# Patient Record
Sex: MALE | Race: WHITE | NOT HISPANIC OR LATINO | Employment: FULL TIME | URBAN - METROPOLITAN AREA
[De-identification: names, ages, dates, MRNs, and addresses within clinical notes are randomized per-mention and may not be internally consistent; named-entity substitution may affect disease eponyms.]

---

## 2018-07-25 ENCOUNTER — HOSPITAL ENCOUNTER (EMERGENCY)
Facility: MEDICAL CENTER | Age: 28
End: 2018-07-26
Attending: EMERGENCY MEDICINE

## 2018-07-25 DIAGNOSIS — S61.210A LACERATION OF RIGHT INDEX FINGER WITHOUT FOREIGN BODY WITHOUT DAMAGE TO NAIL, INITIAL ENCOUNTER: ICD-10-CM

## 2018-07-25 PROCEDURE — 99283 EMERGENCY DEPT VISIT LOW MDM: CPT

## 2018-07-26 VITALS
HEIGHT: 71 IN | RESPIRATION RATE: 16 BRPM | TEMPERATURE: 99.1 F | SYSTOLIC BLOOD PRESSURE: 128 MMHG | OXYGEN SATURATION: 95 % | DIASTOLIC BLOOD PRESSURE: 79 MMHG | WEIGHT: 238.54 LBS | BODY MASS INDEX: 33.4 KG/M2 | HEART RATE: 93 BPM

## 2018-07-26 PROCEDURE — 700101 HCHG RX REV CODE 250

## 2018-07-26 PROCEDURE — 90715 TDAP VACCINE 7 YRS/> IM: CPT | Performed by: EMERGENCY MEDICINE

## 2018-07-26 PROCEDURE — 303747 HCHG EXTRA SUTURE

## 2018-07-26 PROCEDURE — 90471 IMMUNIZATION ADMIN: CPT

## 2018-07-26 PROCEDURE — 304999 HCHG REPAIR-SIMPLE/INTERMED LEVEL 1

## 2018-07-26 PROCEDURE — 700111 HCHG RX REV CODE 636 W/ 250 OVERRIDE (IP): Performed by: EMERGENCY MEDICINE

## 2018-07-26 PROCEDURE — 304217 HCHG IRRIGATION SYSTEM

## 2018-07-26 RX ORDER — LIDOCAINE HYDROCHLORIDE AND EPINEPHRINE BITARTRATE 20; .01 MG/ML; MG/ML
INJECTION, SOLUTION SUBCUTANEOUS
Status: COMPLETED
Start: 2018-07-26 | End: 2018-07-26

## 2018-07-26 RX ADMIN — LIDOCAINE HYDROCHLORIDE AND EPINEPHRINE: 20; 10 INJECTION, SOLUTION INFILTRATION; PERINEURAL at 01:30

## 2018-07-26 RX ADMIN — CLOSTRIDIUM TETANI TOXOID ANTIGEN (FORMALDEHYDE INACTIVATED), CORYNEBACTERIUM DIPHTHERIAE TOXOID ANTIGEN (FORMALDEHYDE INACTIVATED), BORDETELLA PERTUSSIS TOXOID ANTIGEN (GLUTARALDEHYDE INACTIVATED), BORDETELLA PERTUSSIS FILAMENTOUS HEMAGGLUTININ ANTIGEN (FORMALDEHYDE INACTIVATED), BORDETELLA PERTUSSIS PERTACTIN ANTIGEN, AND BORDETELLA PERTUSSIS FIMBRIAE 2/3 ANTIGEN 0.5 ML: 5; 2; 2.5; 5; 3; 5 INJECTION, SUSPENSION INTRAMUSCULAR at 01:34

## 2018-07-26 ASSESSMENT — PAIN SCALES - GENERAL: PAINLEVEL_OUTOF10: 5

## 2018-07-26 NOTE — ED NOTES
Pt is not a fall risk per [place] scale.  Provided call light and instructions to call for assistance.  Pt verbalizes understanding.

## 2018-07-26 NOTE — ED PROVIDER NOTES
"ED Provider Note    CHIEF COMPLAINT  Chief Complaint   Patient presents with   • Laceration     Patient opened a new switch blade knife and accidental cut his right pointer finger.        HPI  Rich Brown is a 27 y.o. male who presents to the emergency department the laceration. The patient inadvertently cut himself with a new knife to the right second phalanx. He does not have any loss of function. He has localized discomfort to the distal and palmar aspect of the right second phalanx. He does not have any loss of flexion or extension. His tetanus is not up to date.    REVIEW OF SYSTEMS  No other muscular skeletal complaints    PHYSICAL EXAM  VITAL SIGNS: /78   Pulse 96   Temp 37 °C (98.6 °F) (Temporal)   Resp 16   Ht 1.803 m (5' 11\")   Wt 108.2 kg (238 lb 8.6 oz)   SpO2 97%   BMI 33.27 kg/m²   In general the patient does not appear toxic  Extremities the patient has a 1 centimeter laceration to the palmar aspect of the right second phalanx. The patient does not have any skeletal deformities. The patient has full flexion and extension at the IP joints.  Skin 1 centimeter laceration described above  Neurovascular examination is intact of the right hand    PROCEDURES Laceration repair  A digital block was performed with lidocaine and epinephrine. After the block was performed the wound is irrigated by myself. I then placed 3 4-0 Ethilon sutures in simple interrupted fashion for primary closure.  COURSE & MEDICAL DECISION MAKING  Pertinent Labs & Imaging studies reviewed. (See chart for details)  This a 27-year-old male who presents with a laceration to the right second phalanx. Primary closure was performed. The patient will receive tetanus prophylaxis prior to discharge. The patient was discharged instructions return for any evidence of infection. Otherwise he'll have the sutures removed in 10 days.    FINAL IMPRESSION  1. 1 centimeter right second phalanx laceration       Disposition  The " patient will be discharged in stable condition      Electronically signed by: Roberto Gonzalez, 7/26/2018 1:31 AM

## 2018-07-26 NOTE — DISCHARGE INSTRUCTIONS
Facial Laceration  A facial laceration is a cut on the face. These injuries can be painful and cause bleeding. Some cuts may need to be closed with stitches (sutures), skin adhesive strips, or wound glue. Cuts usually heal quickly but can leave a scar. It can take 1-2 years for the scar to go away completely.  Follow these instructions at home:  · Only take medicines as told by your doctor.  · Follow your doctor's instructions for wound care.  For Stitches:  · Keep the cut clean and dry.  · If you have a bandage (dressing), change it at least once a day. Change the bandage if it gets wet or dirty, or as told by your doctor.  · Wash the cut with soap and water 2 times a day. Rinse the cut with water. Pat it dry with a clean towel.  · Put a thin layer of medicated cream on the cut as told by your doctor.  · You may shower after the first 24 hours. Do not soak the cut in water until the stitches are removed.  · Have your stitches removed as told by your doctor.  · Do not wear any makeup until a few days after your stitches are removed.  For Skin Adhesive Strips:  · Keep the cut clean and dry.  · Do not get the strips wet. You may take a bath, but be careful to keep the cut dry.  · If the cut gets wet, pat it dry with a clean towel.  · The strips will fall off on their own. Do not remove the strips that are still stuck to the cut.  For Wound Glue:  · You may shower or take baths. Do not soak or scrub the cut. Do not swim. Avoid heavy sweating until the glue falls off on its own. After a shower or bath, pat the cut dry with a clean towel.  · Do not put medicine or makeup on your cut until the glue falls off.  · If you have a bandage, do not put tape over the glue.  · Avoid lots of sunlight or tanning lamps until the glue falls off.  · The glue will fall off on its own in 5-10 days. Do not pick at the glue.  After Healing:  · Put sunscreen on the cut for the first year to reduce your scar.  Contact a doctor if:  · You  have a fever.  Get help right away if:  · Your cut area gets red, painful, or puffy (swollen).  · You see a yellowish-white fluid (pus) coming from the cut.  This information is not intended to replace advice given to you by your health care provider. Make sure you discuss any questions you have with your health care provider.  Document Released: 06/05/2009 Document Revised: 05/25/2017 Document Reviewed: 07/31/2014  [employer] Interactive Patient Education © 2017 [employer]

## 2018-07-26 NOTE — ED TRIAGE NOTES
Chief Complaint   Patient presents with   • Laceration     Patient opened a new switch blade knife and accidental cut his right pointer finger.        Patient ambulated to room with steady gait. Patient has a band aid over laceration. RN did not remove band aid because he states that he just got bleeding to stop. Coagulated blood around band aid. Patient states laceration is 1 inch long.     Patient placed back in lobby and educated on triage process.

## 2018-07-26 NOTE — ED NOTES
Applied dressing to finger.    All lines and monitors DC'd.  Discharge instructions given, questions answered.  Ambulated out of ER, escorted by RN.  Pt states all belongings in possession.  Instructed not to drive after pain medication and pt verbalizes understanding.  RX x0 given.

## 2018-07-26 NOTE — ED NOTES
Chief Complaint   Patient presents with   • Laceration     Patient opened a new switch blade knife and accidental cut his right pointer finger.          Pt taking 1000 mmg of Ibuprofen daily for dental pain.  Ambulated to room.  Agree with triage assessment.  Changing into gown.  Chart placed for ERP eval.

## 2019-04-08 ENCOUNTER — OCCUPATIONAL MEDICINE (OUTPATIENT)
Dept: URGENT CARE | Facility: CLINIC | Age: 29
End: 2019-04-08
Payer: OTHER MISCELLANEOUS

## 2019-04-08 VITALS
HEIGHT: 71 IN | OXYGEN SATURATION: 99 % | BODY MASS INDEX: 33.32 KG/M2 | WEIGHT: 238 LBS | RESPIRATION RATE: 14 BRPM | DIASTOLIC BLOOD PRESSURE: 94 MMHG | TEMPERATURE: 99.1 F | SYSTOLIC BLOOD PRESSURE: 128 MMHG | HEART RATE: 88 BPM

## 2019-04-08 DIAGNOSIS — S49.92XA INJURY OF LEFT SHOULDER, INITIAL ENCOUNTER: Primary | ICD-10-CM

## 2019-04-08 PROCEDURE — 99214 OFFICE O/P EST MOD 30 MIN: CPT | Performed by: FAMILY MEDICINE

## 2019-04-08 NOTE — LETTER
"EMPLOYEE’S CLAIM FOR COMPENSATION/ REPORT OF INITIAL TREATMENT  FORM C-4    EMPLOYEE’S CLAIM - PROVIDE ALL INFORMATION REQUESTED   First Name  Rich Last Name   Birthdate                    1990                Sex  male Claim Number   Home Address  [address] Age  28 y.o. Height  1.803 m (5' 11\") Weight  108 kg (238 lb) [facility]     [facility] [place]  [address] Telephone  [phone] (home)    Mailing Address  [address] [place]  [address] Primary Language Spoken  English    Insurer  Unknown Third Party   [employer]   Employee's Occupation (Job Title) When Injury or Occupational Disease Occurred      Employer's Name  [employer]  Telephone  [phone]    Employer Address  [address]  [facility]  [address]    Date of Injury  4/8/2019               Hour of Injury  11:30 AM Date Employer Notified  4/8/2019 Last Day of Work after Injury or Occupational Disease  4/8/2019 Supervisor to Whom Injury Reported  [place] BONILLA   Address or Location of Accident (if applicable)  [[facility]]   What were you doing at the time of accident? (if applicable)  Closing cargo door.    How did this injury or occupational disease occur? (Be specific an answer in detail. Use additional sheet if necessary)  While pulling the cargo door closed, the door jammed and pulled on my arm.   If you believe that you have an occupational disease, when did you first have knowledge of the disability and it relationship to your employment?  n/a Witnesses to the Accident  n/a      Nature of Injury or Occupational Disease  Strain  Part(s) of Body Injured or Affected  Shoulder (L), Defer, Defer    I certify that the above is true and correct to the best of my knowledge and that I have provided this information in order to obtain the benefits of [facility] Industrial Insurance " and Occupational Diseases Acts [place]  I hereby authorize any physician, chiropractor, surgeon, practitioner, or other person, any hospital, including [facility] or [facility] hospital, any medical service organization, any insurance company, or other institution or organization to release to each other, any medical or other information, including benefits paid or payable, pertinent to this injury or disease, except information relative to diagnosis, treatment and/or counseling for AIDS, psychological conditions, alcohol or controlled substances, for which I must give specific authorization.  A Photostat of this authorization shall be as valid as the original.     Date   Place   Employee’s Signature   THIS REPORT MUST BE COMPLETED AND MAILED WITHIN 3 WORKING DAYS OF TREATMENT   Place  [facility]  Name of Facility  [facility]   Date  4/8/2019 Diagnosis  (S49.92XA) Injury of left shoulder, initial encounter  (primary encounter diagnosis) Is there evidence the injured employee was under the influence of alcohol and/or another controlled substance at the time of accident?   Hour  8:07 PM Description of Injury or Disease  The encounter diagnosis was Injury of left shoulder, initial encounter. No   Treatment  Resting and icing frequently, over-the-counter Aleve 2 tablet twice daily for pain  Sling for comfort, encourage range of motion training as we discussed  We will go ahead and obtain an MRI of the left shoulder to rule out soft tissue injury once it gets approved within the next couple of days  Plan to follow-up in 2 days in urgent care  Have you advised the patient to remain off work five days or more? No   X-Ray Findings      If Yes   From Date  To Date      From information given by the employee, together with medical evidence, can you directly connect this injury or occupational disease as job incurred?  Yes If No Full Duty  No Modified  "Duty  No   Is additional medical care by a physician indicated?  Yes If Modified Duty, Specify any Limitations / Restrictions  Recommend resting for couple days   Do you know of any previous injury or disease contributing to this condition or occupational disease?                            No   Date  4/8/2019 Print Doctor’s Name Anni Chan M.D. I certify the employer’s copy of  this form was mailed on:   Address  [address] [place] [address] [address]     [facility]  [address]    Provider’s Tax ID Number  [id number] Telephone  Dept: [phone]        e-ANNI Ricketts M.D.   e-Signature: Dr. Alexandre Ramirez, Medical Director Degree  MD        ORIGINAL-TREATING PHYSICIAN OR CHIROPRACTOR    PAGE 2-INSURER/TPA    PAGE 3-EMPLOYER    PAGE 4-EMPLOYEE             Form C-4 (rev10/07)              BRIEF DESCRIPTION OF RIGHTS AND BENEFITS  (Pursuant to [place]RS 616C.050)    Notice of Injury or Occupational Disease (Incident Report Form C-1): If an injury or occupational disease (OD) arises out of and in the  course of employment, you must provide written notice to your employer as soon as practicable, but no later than 7 days after the accident or  OD. Your employer shall maintain a sufficient supply of the required forms.    Claim for Compensation (Form C-4): If medical treatment is sought, the form C-4 is available at the place of initial treatment. A completed  \"Claim for Compensation\" (Form C-4) must be filed within 90 days after an accident or OD. The treating physician or chiropractor must,  within 3 working days after treatment, complete and mail to the employer, the employer's insurer and third-party , the Claim for  Compensation.    Medical Treatment: If you require medical treatment for your on-the-job injury or OD, you may be required to select a physician or  chiropractor from a list provided by your workers’ compensation insurer, if it has contracted with an Organization for Managed " Care (MCO) or  Preferred Provider Organization (PPO) or providers of health care. If your employer has not entered into a contract with an MCO or PPO, you  may select a physician or chiropractor from the Panel of Physicians and Chiropractors. Any medical costs related to your industrial injury or  OD will be paid by your insurer.    Temporary Total Disability (TTD): If your doctor has certified that you are unable to work for a period of at least 5 consecutive days, or 5  cumulative days in a 20-day period, or places restrictions on you that your employer does not accommodate, you may be entitled to TTD  compensation.    Temporary Partial Disability (TPD): If the wage you receive upon reemployment is less than the compensation for TTD to which you are  entitled, the insurer may be required to pay you TPD compensation to make up the difference. TPD can only be paid for a maximum of 24  months.    Permanent Partial Disability (PPD): When your medical condition is stable and there is an indication of a PPD as a result of your injury or  OD, within 30 days, your insurer must arrange for an evaluation by a rating physician or chiropractor to determine the degree of your PPD. The  amount of your PPD award depends on the date of injury, the results of the PPD evaluation and your age and wage.    Permanent Total Disability (PTD): If you are medically certified by a treating physician or chiropractor as permanently and totally disabled  and have been granted a PTD status by your insurer, you are entitled to receive monthly benefits not to exceed 66 2/3% of your average  monthly wage. The amount of your PTD payments is subject to reduction if you previously received a PPD award.    Vocational Rehabilitation Services: You may be eligible for vocational rehabilitation services if you are unable to return to the job due to a  permanent physical impairment or permanent restrictions as a result of your injury or occupational  disease.    Transportation and Per Judy Reimbursement: You may be eligible for travel expenses and per judy associated with medical treatment.    Reopening: You may be able to reopen your claim if your condition worsens after claim closure.    Appeal Process: If you disagree with a written determination issued by the insurer or the insurer does not respond to your request, you may  appeal to the Department of Administration, , by following the instructions contained in your determination letter. You must  appeal the determination within 70 days from the date of the determination letter at [address], [address], [place], [place]  [address], or [address], [address], [place], [place] [address]. If you disagree with the  decision, you may appeal to the  Department of Administration, . You must file your appeal within 30 days from the date of the  decision  letter at [address], [address], [place], [place] [address], or [address] [facility] [address], [place], [place] [address]. If you  disagree with a decision of an , you may file a petition for judicial review with the District Court. You must do so within 30  days of the Appeal Officer’s decision. You may be represented by an  at your own expense or you may contact the [facility] for possible  representation.    [place]  for Injured Workers (NAIW): If you disagree with a  decision, you may request that NAIW represent you  without charge at an  Hearing. For information regarding denial of benefits, you may contact the [facility] at: [address], [address], [place] [address], [phone], or [address] [facility] [address] [address], [phone]    To File a Complaint with the Division: If you wish to file a complaint with the  of the Division of Industrial Relations (DIR),  please contact  the Workers’ Compensation Section, [address], [address], [place], [place] [address], telephone (560) [phone], or  [address], [address], [place], [place] [address], telephone ([phone]) [phone].    For assistance with Workers’ Compensation Issues: you may contact the Office of the [facility] Consumer Health Assistance, [address] [place] [address], [address], [place], [place] [address], Toll Free 1-805.521.1433, Web site: http://govcha.[facility].nv., E-mail  [email or web address].[email or web address].                                                                                                                                                                                                                                   __________________________________________________________________                                                                   _________________                Employee Name / Signature                                                                                                                                                       Date                                                                                                                                                                                                     D-2 (rev. 10/07)

## 2019-04-08 NOTE — LETTER
[place] [id number] [facility] [address] [place] [address]  Phone:  [phone] - Fax:  [phone]   [facility] Progress Report and Disability Certification  Date of Service: 4/8/2019   No Show:  No  Date / Time of Next Visit: 4/10/2019@3:00 PM   Claim Information   Patient Name: Rich Brown  Claim Number:     Employer: [employer]  Date of Injury: 4/8/2019     Insurer / TPA: [employer]  ID / SSN:     Occupation:   Diagnosis: The encounter diagnosis was Injury of left shoulder, initial encounter.    Medical Information   Related to Industrial Injury? Yes    Subjective Complaints:  Date of injury: April 8, 2019  Patient works for [employer] and was trying to pull the door on the back of the truck down had a sudden pain in the left shoulder after which she could not move the left shoulder.  Denies any prior injury in the past.  Denies any paresthesias.  Reports tenderness on the lateral aspect of the left shoulder.  He has not taken any over-the-counter medication he is here for evaluation.   Objective Findings: Physical Exam   Constitutional: He is oriented to person, place, and time. He appears well-developed and well-nourished.  Non-toxic appearance. No distress.   HENT:   Head: Normocephalic and atraumatic.   Nose: Nose normal.   Mouth/Throat: Oropharynx is clear and moist.   Eyes: Conjunctivae are normal.   Cardiovascular: Normal rate.    Pulmonary/Chest: Effort normal. No stridor. No respiratory distress.   Musculoskeletal:        Right shoulder: Normal.        Left shoulder: He exhibits decreased range of motion (Significant decrease in range of motion), tenderness (Over the lateral aspect of the left shoulder, no bruising or swelling or erythema noted.) and bony tenderness. He exhibits no swelling, no effusion, no crepitus, no deformity, no laceration, normal pulse and normal strength.        Right elbow: Normal.       Left elbow:  Normal.        Right upper arm: Normal.        Left upper arm: Normal.        Arms:  Neurological: He is alert and oriented to person, place, and time.   Skin: Skin is warm. No rash noted. He is not diaphoretic. No erythema. No pallor.   Psychiatric: He has a normal mood and affect.      Pre-Existing Condition(s):     Assessment:   Initial Visit    Status: Additional Care Required  Permanent Disability:No    Plan: DiagnosticsMedication  Comments:Icing frequently, over-the-counter Aleve 2 tablets twice daily with food as needed for pain, resting, sling for comfort only, MRI is ordered for the left shoulder and follow-up in 2 days    Diagnostics: MRI  Comments:Needs MRI of the left shoulder stat within the next couple of days, hopefully tomorrow    Comments:       Disability Information   Status: Temporarily Totally Disabled    From:  4/8/2019  Through: 4/10/2019 Restrictions are: Temporary   Physical Restrictions   Sitting:    Standing:    Stooping:    Bending:      Squatting:    Walking:    Climbing:    Pushing:      Pulling:    Other:    Reaching Above Shoulder (L):   Reaching Above Shoulder (R):       Reaching Below Shoulder (L):    Reaching Below Shoulder (R):      Not to exceed Weight Limits   Carrying(hrs):   Weight Limit(lb):   Lifting(hrs):   Weight  Limit(lb):     Comments: Resting and icing frequently, over-the-counter Aleve 2 tablet twice daily for pain  Sling for comfort, encourage range of motion training as we discussed  We will go ahead and obtain an MRI of the left shoulder to rule out soft tissue injury once it gets approved within the next couple of days  Plan to follow-up in 2 days in urgent care    Repetitive Actions   Hands: i.e. Fine Manipulations from Grasping:     Feet: i.e. Operating Foot Controls:     Driving / Operate Machinery:     Physician Name: Anni Chan M.D. Physician Signature: ANNI Calderon M.D. e-Signature: Dr. Alexandre Ramirez, Medical Director   Clinic Name / Location:  [address]  [place] [address] Clinic Phone Number: Dept: [phone]   Appointment Time: 6:30 Pm Visit Start Time: 8:07 PM   Check-In Time:  6:41 Pm Visit Discharge Time:  8:54 PM   Original-Treating Physician or Chiropractor    Page 2-Insurer/TPA    Page 3-Employer    Page 4-Employee

## 2019-04-09 NOTE — PROGRESS NOTES
Physical Exam   Constitutional: He is oriented to person, place, and time. He appears well-developed and well-nourished.  Non-toxic appearance. No distress.   HENT:   Head: Normocephalic and atraumatic.   Nose: Nose normal.   Mouth/Throat: Oropharynx is clear and moist.   Eyes: Conjunctivae are normal.   Cardiovascular: Normal rate.    Pulmonary/Chest: Effort normal. No stridor. No respiratory distress.   Musculoskeletal:        Right shoulder: Normal.        Left shoulder: He exhibits decreased range of motion (Significant decrease in range of motion), tenderness (Over the outline area, no bruising or swelling or erythema noted.) and bony tenderness. He exhibits no swelling, no effusion, no crepitus, no deformity, no laceration, normal pulse and normal strength.        Right elbow: Normal.       Left elbow: Normal.        Right upper arm: Normal.        Left upper arm: Normal.        Arms:  Neurological: He is alert and oriented to person, place, and time.   Skin: Skin is warm. No rash noted. He is not diaphoretic. No erythema. No pallor.   Psychiatric: He has a normal mood and affect.

## 2019-04-09 NOTE — PROGRESS NOTES
"Subjective:      Rich Brown is a 28 y.o. male who presents with Shoulder Injury (While pulling the cargo door it closed, the door jammed and pulled arm (LT))      Date of injury: April 8, 2019  Patient works for [employer] and was trying to pull the door on the back of the truck down had a sudden pain in the left shoulder after which she could not move the left shoulder.  Denies any prior injury in the past.  Denies any paresthesias.  Reports tenderness on the lateral aspect of the left shoulder.  He has not taken any over-the-counter medication he is here for evaluation.     HPI    ROS       Objective:     /94   Pulse 88   Temp 37.3 °C (99.1 °F)   Resp 14   Ht 1.803 m (5' 11\")   Wt 108 kg (238 lb)   SpO2 99%   BMI 33.19 kg/m²      Physical Exam    Physical Exam   Constitutional: He is oriented to person, place, and time. He appears well-developed and well-nourished.  Non-toxic appearance. No distress.   HENT:   Head: Normocephalic and atraumatic.   Nose: Nose normal.   Mouth/Throat: Oropharynx is clear and moist.   Eyes: Conjunctivae are normal.   Cardiovascular: Normal rate.    Pulmonary/Chest: Effort normal. No stridor. No respiratory distress.   Musculoskeletal:        Right shoulder: Normal.        Left shoulder: He exhibits decreased range of motion (Significant decrease in range of motion), tenderness (Over the lateral aspect of the left shoulder, no bruising or swelling or erythema noted.) and bony tenderness. He exhibits no swelling, no effusion, no crepitus, no deformity, no laceration, normal pulse and normal strength.        Right elbow: Normal.       Left elbow: Normal.        Right upper arm: Normal.        Left upper arm: Normal.        Arms:  Neurological: He is alert and oriented to person, place, and time.   Skin: Skin is warm. No rash noted. He is not diaphoretic. No erythema. No pallor.   Psychiatric: He has a normal mood and affect.          Assessment/Plan:     1. Injury of " left shoulder, initial encounter  - REFERRAL TO RADIOLOGY  - MR-SHOULDER-W/O LEFT; Future  - Slings    Concern for soft tissue tear  Sling for comfort, icing frequently and over-the-counter Aleve twice daily as needed  Close follow-up in 2 days  Depending on the results of MRI he may need to see a specialist

## 2019-04-10 ENCOUNTER — TELEPHONE (OUTPATIENT)
Dept: URGENT CARE | Facility: CLINIC | Age: 29
End: 2019-04-10

## 2019-04-10 ENCOUNTER — OCCUPATIONAL MEDICINE (OUTPATIENT)
Dept: URGENT CARE | Facility: CLINIC | Age: 29
End: 2019-04-10
Payer: COMMERCIAL

## 2019-04-10 VITALS
OXYGEN SATURATION: 100 % | HEIGHT: 71 IN | WEIGHT: 238 LBS | BODY MASS INDEX: 33.32 KG/M2 | HEART RATE: 96 BPM | DIASTOLIC BLOOD PRESSURE: 78 MMHG | RESPIRATION RATE: 16 BRPM | SYSTOLIC BLOOD PRESSURE: 124 MMHG | TEMPERATURE: 98.4 F

## 2019-04-10 DIAGNOSIS — M25.619 LIMITED RANGE OF MOTION (ROM) OF SHOULDER: ICD-10-CM

## 2019-04-10 DIAGNOSIS — S49.92XD SHOULDER INJURY, LEFT, SUBSEQUENT ENCOUNTER: ICD-10-CM

## 2019-04-10 PROCEDURE — 99214 OFFICE O/P EST MOD 30 MIN: CPT | Mod: 29 | Performed by: PHYSICIAN ASSISTANT

## 2019-04-10 ASSESSMENT — ENCOUNTER SYMPTOMS
BACK PAIN: 0
FALLS: 0
JOINT SWELLING: 0
HEADACHES: 0

## 2019-04-10 NOTE — LETTER
[facility]  [address] [place] [address]  Phone:  [phone] - Fax:  [phone]   [facility] Progress Report and Disability Certification  Date of Service: 4/10/2019   No Show:  No  Date / Time of Next Visit: 4/14/2019 @ 2:40pm   Claim Information   Patient Name: Rich Brown  Claim Number:     Employer: [employer]  Date of Injury: 4/8/2019     Insurer / TPA: [employer]  ID / SSN:     Occupation:   Diagnosis: Diagnoses of Shoulder injury, left, subsequent encounter and Limited range of motion (ROM) of shoulder were pertinent to this visit.    Medical Information   Related to Industrial Injury? Yes    Subjective Complaints:  Date of injury: April 8, 2019- Copied from original visit.   Injury: Patient works for [employer] and was trying to pull the door on the back of the truck down had a sudden pain in the left shoulder after which she could not move the left shoulder.  Denies any prior injury in the past.    Last visit MRI was ordered as concern for deeper structures of the shoulder-patient is still awaiting appointment and approval.  Patient reports he continues to utilize ibuprofen at night with notable improvement.  He continues to also have limited range of motion with moving his arm away from his body.  He also reports weakness to his left side as well.  Patient is right-handed.   Objective Findings: Shoulder:Without noted swelling, erythema, ecchymosis, or noted deformity.  Diffuse tenderness to the lateral shoulder.  Very limited range of motion with abduction.  Unable to conduct drop arm secondary to pain.  Passive and active motion is painful.   Sensation intact, distal pulses 2+.   is minimally weaker on the left.     Pre-Existing Condition(s):     Assessment:   Condition Same    Status: Additional Care Required  Permanent Disability:No    Plan:      Diagnostics:      Comments:       Disability Information      Status: Released to Restricted Duty    From:  4/10/2019  Through: 2019 Restrictions are: Temporary   Physical Restrictions   Sitting:    Standing:    Stooping:    Bending:      Squatting:    Walking:    Climbin hrs/day Pushing:      Pulling:    Other:    Reaching Above Shoulder (L):   Reaching Above Shoulder (R):       Reaching Below Shoulder (L):    Reaching Below Shoulder (R):      Not to exceed Weight Limits   Carrying(hrs):   Weight Limit(lb): < or = to 10 pounds Lifting(hrs):   Weight  Limit(lb): < or = to 10 pounds   Comments: Restrictions: No use of the left upper extremity.  Utilize sling (patient was given 1 in clinic today) as needed and continue ibuprofen in particular at nighttime.  Patient is to return to clinic on  or sooner for any worsening symptoms.  Encourage patient to be in contact with his HR provider for further discussion on approval of MRI.    Repetitive Actions   Hands: i.e. Fine Manipulations from Grasping:     Feet: i.e. Operating Foot Controls:     Driving / Operate Machinery:     Physician Name: Cisco Rodney P.A.-C. Physician Signature: CISCO Theodore P.A.-C. e-Signature: Dr. Alexandre Ramirez, Medical Director   Clinic Name / Location: [address] [place] [address] Clinic Phone Number: Dept: [phone]   Appointment Time: 3:00 Pm Visit Start Time: 3:18 PM   Check-In Time:  3:10 Pm Visit Discharge Time:  3:41pm   Original-Treating Physician or Chiropractor    Page 2-Insurer/TPA    Page 3-Employer    Page 4-Employee

## 2019-04-10 NOTE — TELEPHONE ENCOUNTER
Call patient for follow-up as a courtesy  Referral to radiology was done and MRI was ordered but sounds like it has not been done yet.  My recommendation was patient to be seen in couple of days which is today for follow-up and I recommended and reminded the patient that he should go to 1 of our urgent cares for a follow-up today.

## 2019-04-11 NOTE — PROGRESS NOTES
"Subjective:      Rich Brown is a 28 y.o. male who presents with Other ([place] injury  still the same feeling, very limited to movement, away from the body causes it to hurt x DOI 04/08/2019 )      Date of injury: April 8, 2019- Copied from original visit.   Injury: Patient works for [employer] and was trying to pull the door on the back of the truck down had a sudden pain in the left shoulder after which she could not move the left shoulder.  Denies any prior injury in the past.    Last visit MRI was ordered as concern for deeper structures of the shoulder-patient is still awaiting appointment and approval.  Patient reports he continues to utilize ibuprofen at night with notable improvement.  He continues to also have limited range of motion with moving his arm away from his body.  He also reports weakness to his left side as well.  Patient is right-handed.     Other   This is a new problem. Episode onset: 4/8. The problem occurs constantly. The problem has been unchanged. Pertinent negatives include no headaches or joint swelling. Exacerbated by: Movement of his arm. He has tried NSAIDs for the symptoms. The treatment provided moderate relief.       Review of Systems   Musculoskeletal: Positive for joint pain. Negative for back pain, falls and joint swelling.   Neurological: Negative for headaches.   All other systems reviewed and are negative.         Objective:     /78   Pulse 96   Temp 36.9 °C (98.4 °F)   Resp 16   Ht 1.803 m (5' 11\")   Wt 108 kg (238 lb)   SpO2 100%   BMI 33.19 kg/m²    PMH:  has no past medical history on file.  MEDS: No current outpatient prescriptions on file.  ALLERGIES: No Known Allergies  SURGHX:   Past Surgical History:   Procedure Laterality Date   • ARCH BAR REMOVAL OR REPAIR  10/28/08    Performed by ORLY MON at SURGERY [facility] DAY [facility] ORS   • ARCH BAR APPLICATION  9/24/08    Performed by KAREY MILES at SURGERY [facility] ORS   • CLOSED " REDUCTION  9/24/08    Performed by KAREY MILES at SURGERY [facility]S   • ACL REPAIR      left knee     SOCHX:  reports that he has been smoking Cigarettes.  He has a 3.00 pack-year smoking history. He has never used smokeless tobacco. He reports that he drinks alcohol. He reports that he does not use drugs.  FH: Family history was reviewed, no pertinent findings to report    Physical Exam   Constitutional: He is oriented to person, place, and time. He appears well-developed and well-nourished. No distress.   HENT:   Head: Normocephalic and atraumatic.   Eyes: Pupils are equal, round, and reactive to light. Conjunctivae and EOM are normal.   Neck: Normal range of motion. Neck supple. No tracheal deviation present.   Cardiovascular: Normal rate.    No murmur heard.  Pulmonary/Chest: Effort normal. No respiratory distress.   Neurological: He is alert and oriented to person, place, and time.   Skin: Skin is warm. No rash noted.   Psychiatric: He has a normal mood and affect. His behavior is normal. Judgment and thought content normal.   Vitals reviewed.      Shoulder:Without noted swelling, erythema, ecchymosis, or noted deformity.  Diffuse tenderness to the lateral shoulder.  Very limited range of motion with abduction.  Unable to conduct drop arm secondary to pain.  Passive and active motion is painful.   Sensation intact, distal pulses 2+.   is minimally weaker on the left.         Assessment/Plan:     1. Shoulder injury, left, subsequent encounter  2. Limited range of motion (ROM) of shoulder    Patient has been totally temporarily disabled.  We will trial light duty if able with no use of the left upper extremity.  Patient is to follow-up after work week on Sunday for update on MRI appointment and reevaluation of light duty.  Patient most likely will need appointment with occupational health due to potential length of approval for MRI.  Continue with over-the-counter's as discussed.  Continue with  sling as needed intermittently.  Please see D 39 for further information.

## 2019-04-15 ENCOUNTER — OCCUPATIONAL MEDICINE (OUTPATIENT)
Dept: URGENT CARE | Facility: CLINIC | Age: 29
End: 2019-04-15
Payer: OTHER MISCELLANEOUS

## 2019-04-15 VITALS
BODY MASS INDEX: 33.32 KG/M2 | HEIGHT: 71 IN | DIASTOLIC BLOOD PRESSURE: 88 MMHG | HEART RATE: 100 BPM | OXYGEN SATURATION: 97 % | SYSTOLIC BLOOD PRESSURE: 120 MMHG | RESPIRATION RATE: 16 BRPM | TEMPERATURE: 98.4 F | WEIGHT: 238 LBS

## 2019-04-15 DIAGNOSIS — S43.402D SPRAIN OF LEFT SHOULDER, UNSPECIFIED SHOULDER SPRAIN TYPE, SUBSEQUENT ENCOUNTER: ICD-10-CM

## 2019-04-15 PROCEDURE — 99214 OFFICE O/P EST MOD 30 MIN: CPT | Performed by: FAMILY MEDICINE

## 2019-04-15 NOTE — LETTER
[address] [place] [address]  Phone:  [phone] - Fax:  [phone]   [facility] Progress Report and Disability Certification  Date of Service: 4/15/2019   No Show:  No  Date / Time of Next Visit: 4/24/2019@4:30 PM   Claim Information   Patient Name: Rich Brown  Claim Number:     Employer: [employer]  Date of Injury: 12/22/2016     Insurer / TPA: [employer]  ID / SSN:     Occupation:   Diagnosis: The encounter diagnosis was Sprain of left shoulder, unspecified shoulder sprain type, subsequent encounter.    Medical Information   Related to Industrial Injury? Yes    Subjective Complaints:  Date of injury: April 8, 2019- Copied from original visit.   Injury: Patient works for [employer] and was trying to pull the door on the back of the truck down had a sudden pain in the left shoulder after which she could not move the left shoulder.  Denies any prior injury in the past.   * 4/15/2019 feels some improvement but still hurts and doesn't feel safe driving at work    Objective Findings: Lt shoulder: + TTP. Flex/abd 90deg. + speeds test    Pre-Existing Condition(s):     Assessment:   Condition Improved    Status: Additional Care Required  Permanent Disability:No    Plan:      Diagnostics:      Comments:       Disability Information   Status: Released to Restricted Duty    From:  4/15/2019  Through: 4/24/2019 Restrictions are: Temporary   Physical Restrictions   Sitting:    Standing:    Stooping:    Bending:      Squatting:    Walking:    Climbing:    Pushing:      Pulling:    Other:    Reaching Above Shoulder (L):   Reaching Above Shoulder (R):       Reaching Below Shoulder (L):    Reaching Below Shoulder (R):      Not to exceed Weight Limits   Carrying(hrs):   Weight Limit(lb):   Lifting(hrs):   Weight  Limit(lb):     Comments: ** USE LEFT ARM AS TOLERATED. NO DRIVING **    Repetitive Actions   Hands: i.e. Fine  Manipulations from Grasping:     Feet: i.e. Operating Foot Controls:     Driving / Operate Machinery:     Physician Name: Jose Armando Barahona M.D. Physician Signature: JOSE ARMANDO Carey M.D. e-Signature: Dr. Alexandre Ramirez, Medical Director   Clinic Name / Location: [address] [address] Clinic Phone Number: Dept: [phone]   Appointment Time: 8:30 Pm Visit Start Time: 8:40 PM   Check-In Time:  8:30 Pm Visit Discharge Time:  9:10 PM   Original-Treating Physician or Chiropractor    Page 2-Insurer/TPA    Page 3-Employer    Page 4-Employee

## 2019-04-16 NOTE — PROGRESS NOTES
"Subjective:      Rich Brown is a 28 y.o. male who presents with Follow-Up (WC Lt shoulder. Pt states he is still feeling pain when moving )      Date of injury: April 8, 2019- Copied from original visit.   Injury: Patient works for [employer] and was trying to pull the door on the back of the truck down had a sudden pain in the left shoulder after which she could not move the left shoulder.  Denies any prior injury in the past.   * 4/15/2019 feels some improvement but still hurts and doesn't feel safe driving at work      HPI    Review of Systems   Musculoskeletal: Positive for joint pain.   All other systems reviewed and are negative.         Objective:     /88   Pulse 100   Temp 36.9 °C (98.4 °F)   Resp 16   Ht 1.803 m (5' 11\")   Wt 108 kg (238 lb)   SpO2 97%   BMI 33.19 kg/m²      Physical Exam   Constitutional: He appears well-developed. No distress.   HENT:   Head: Normocephalic and atraumatic.   Cardiovascular: Regular rhythm.    No murmur heard.  Pulmonary/Chest: Effort normal. No respiratory distress.   Neurological: He is alert. He exhibits normal muscle tone.   Skin: Skin is warm.   Psychiatric: He has a normal mood and affect. Judgment normal.   Nursing note and vitals reviewed.      Lt shoulder: + TTP. Flex/abd 90deg. + speeds test        Assessment/Plan:         1. Sprain of left shoulder, unspecified shoulder sprain type, subsequent encounter           - work related     -  USE LEFT ARM AS TOLERATED. NO DRIVING     - ROM stretching for 5 min BID at home and cont OTC Motrin    - 1 wk recheck         "

## 2019-04-23 ENCOUNTER — TELEPHONE (OUTPATIENT)
Dept: OCCUPATIONAL MEDICINE | Facility: CLINIC | Age: 29
End: 2019-04-23

## 2019-04-24 ENCOUNTER — OCCUPATIONAL MEDICINE (OUTPATIENT)
Dept: OCCUPATIONAL MEDICINE | Facility: CLINIC | Age: 29
End: 2019-04-24
Payer: COMMERCIAL

## 2019-04-24 VITALS
BODY MASS INDEX: 33.32 KG/M2 | TEMPERATURE: 98.1 F | WEIGHT: 238 LBS | HEART RATE: 101 BPM | OXYGEN SATURATION: 97 % | DIASTOLIC BLOOD PRESSURE: 80 MMHG | HEIGHT: 71 IN | SYSTOLIC BLOOD PRESSURE: 110 MMHG

## 2019-04-24 DIAGNOSIS — S46.912D STRAIN OF LEFT SHOULDER, SUBSEQUENT ENCOUNTER: ICD-10-CM

## 2019-04-24 PROCEDURE — 99204 OFFICE O/P NEW MOD 45 MIN: CPT | Performed by: PREVENTIVE MEDICINE

## 2019-04-24 RX ORDER — IBUPROFEN 400 MG/1
400 TABLET ORAL EVERY 6 HOURS PRN
COMMUNITY
End: 2020-07-12

## 2019-04-24 NOTE — LETTER
[address],   [address] [place] [address]  Phone:  [phone] - Fax:  [phone]   [facility] Progress Report and Disability Certification  Date of Service: 4/24/2019   No Show:  No  Date / Time of Next Visit: 5/8/2019 @ 4:15 Pm   Claim Information   Patient Name: Rich Brown  Claim Number:     Employer: [employer]  Date of Injury: 4/8/2019     Insurer / TPA: [employer]  ID / SSN:     Occupation:   Diagnosis: The encounter diagnosis was Strain of left shoulder, subsequent encounter.    Medical Information   Related to Industrial Injury? Yes    Subjective Complaints:  DOI 4/8/2019: 27 yo male presents with left shoulder strain. Patient works for [employer] and was trying to pull the door on the back of the truck down had a sudden pain in the left shoulder.  He was seen in the urgent care, MRI was ordered on first visit.  Patient notes overall good improvement since last visit.  He states overall he feels about 50 to 60% improved.  He has better range of motion.  Some movements are still painful and is not really test of the arm yet.  He takes ibuprofen for relief.  Denies radiating pain, numbness or tingling.  Shoulder pain is mostly located in the anterior/upper bicep region.   Objective Findings: Left shoulder: No gross deformity.  Mild tenderness anterior GH and bicipital groove.  Shoulder flexion essentially normal with mild pain and abduction reduced to about 150 degrees with pain.  Strength intact.  Empty can test negative.  Speeds test negative.  Terrell test negative.   Pre-Existing Condition(s):     Assessment:   Condition Improved    Status: Additional Care Required  Permanent Disability:No    Plan:      Diagnostics:      Comments:  At this point patient became a candidate for physical therapy, and made referral to physical therapy  Recommend still getting MRI right shoulder if  approved  Ibuprofen 400 mg 4 times daily as needed  Restricted duty  Follow-up 2 weeks    Disability Information   Status: Released to Restricted Duty    From:  4/24/2019  Through: 5/8/2019 Restrictions are: Temporary   Physical Restrictions   Sitting:    Standing:    Stooping:    Bending:      Squatting:    Walking:    Climbing:    Pushing:      Pulling:    Other:    Reaching Above Shoulder (L):   Reaching Above Shoulder (R): < or = 1 hrs/day     Reaching Below Shoulder (L):    Reaching Below Shoulder (R):      Not to exceed Weight Limits   Carrying(hrs):   Weight Limit(lb): < or = to 10 pounds Lifting(hrs):   Weight  Limit(lb): < or = to 10 pounds   Comments: 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 Restrictions apply to right arm.    Repetitive Actions   Hands: i.e. Fine Manipulations from Grasping:     Feet: i.e. Operating Foot Controls:     Driving / Operate Machinery: 0 hrs/day   Physician Name: Rene Alonso D.O. Physician Signature: RENE Leach D.O. e-Signature: Dr. Alexandre Ramirez, Medical Director   Clinic Name / Location: [address],   [address] [place] [address] Clinic Phone Number: Dept: [phone]   Appointment Time: 4:30 Pm Visit Start Time: 4:06 PM   Check-In Time:  4:00 Pm Visit Discharge Time:  4:31 PM   Original-Treating Physician or Chiropractor    Page 2-Insurer/TPA    Page 3-Employer    Page 4-Employee

## 2019-05-08 ENCOUNTER — OCCUPATIONAL MEDICINE (OUTPATIENT)
Dept: OCCUPATIONAL MEDICINE | Facility: CLINIC | Age: 29
End: 2019-05-08
Payer: COMMERCIAL

## 2019-05-08 VITALS
HEIGHT: 71 IN | BODY MASS INDEX: 33.32 KG/M2 | RESPIRATION RATE: 14 BRPM | HEART RATE: 97 BPM | OXYGEN SATURATION: 98 % | WEIGHT: 238 LBS | DIASTOLIC BLOOD PRESSURE: 86 MMHG | SYSTOLIC BLOOD PRESSURE: 122 MMHG | TEMPERATURE: 98.2 F

## 2019-05-08 DIAGNOSIS — S46.912D STRAIN OF LEFT SHOULDER, SUBSEQUENT ENCOUNTER: ICD-10-CM

## 2019-05-08 PROCEDURE — 99213 OFFICE O/P EST LOW 20 MIN: CPT | Performed by: PREVENTIVE MEDICINE

## 2019-05-08 ASSESSMENT — ENCOUNTER SYMPTOMS
SENSORY CHANGE: 0
TINGLING: 0

## 2019-05-08 ASSESSMENT — PAIN SCALES - GENERAL: PAINLEVEL: 2=MINIMAL-SLIGHT

## 2019-05-08 NOTE — PROGRESS NOTES
"Subjective:      Rich Brown is a 28 y.o. male who presents with Follow-Up (WC DOI 04/08/19- L shoulder- better - rm 16)      DOI 4/8/2019: 29 yo male presents with left shoulder strain. Patient works for [employer] and was trying to pull the door on the back of the truck down had a sudden pain in the left shoulder.  Patient states that overall left shoulder pain has been gradually improving.  He states he does have some increase in range of motion.  He had MRI performed recently and has physical therapy scheduled to begin.     HPI    Review of Systems   Neurological: Negative for tingling and sensory change.     SOCHX: Works as a  at [employer]  FH: No pertinent family history to this problem.     Objective:     /86 (BP Location: Right arm, Patient Position: Sitting, BP Cuff Size: Adult)   Pulse 97   Temp 36.8 °C (98.2 °F) (Temporal)   Resp 14   Ht 1.803 m (5' 11\")   Wt 108 kg (238 lb)   SpO2 98%   BMI 33.19 kg/m²      Physical Exam   Constitutional: He is oriented to person, place, and time. He appears well-developed and well-nourished.   Cardiovascular: Normal rate.    Pulmonary/Chest: Effort normal.   Neurological: He is alert and oriented to person, place, and time.   Skin: Skin is warm and dry.   Psychiatric: He has a normal mood and affect. Judgment normal.       Left shoulder: No gross deformity.  Mild tenderness anterior GH and bicipital groove.  Shoulder flexion essentially normal with abduction reduced to about 150 degrees with pain.  Strength intact.     MRI left shoulder: Normal MRI of shoulder.  No rotator cuff tear or labral tear.       Assessment/Plan:     1. Strain of left shoulder, subsequent encounter  Continue gentle range of motion exercises  Begin physical therapy  Continue ibuprofen as needed  Continue restricted duty  Follow-up 3 weeks, if improvement continues will likely released to full duty      "

## 2019-05-08 NOTE — LETTER
[address],   [address] [place] [address]  Phone:  [phone] - Fax:  [phone]   [facility] Progress Report and Disability Certification  Date of Service: 5/8/2019   No Show:  No  Date / Time of Next Visit: 5/29/2019 @ 4:15 pm   Claim Information   Patient Name: Rich Brown  Claim Number:     Employer: [employer]  Date of Injury: 4/8/2019     Insurer / TPA: [employer]  ID / SSN:     Occupation:   Diagnosis: The encounter diagnosis was Strain of left shoulder, subsequent encounter.    Medical Information   Related to Industrial Injury? Yes    Subjective Complaints:  DOI 4/8/2019: 27 yo male presents with left shoulder strain. Patient works for [employer] and was trying to pull the door on the back of the truck down had a sudden pain in the left shoulder.  Patient states that overall left shoulder pain has been gradually improving.  He states he does have some increase in range of motion.  He had MRI performed recently and has physical therapy scheduled to begin.   Objective Findings: Left shoulder: No gross deformity.  Mild tenderness anterior GH and bicipital groove.  Shoulder flexion essentially normal with abduction reduced to about 150 degrees with pain.  Strength intact.     MRI left shoulder: Normal MRI of shoulder.  No rotator cuff tear or labral tear.   Pre-Existing Condition(s):     Assessment:   Condition Same    Status: Additional Care Required  Permanent Disability:No    Plan:      Diagnostics:      Comments:  Continue gentle range of motion exercises  Begin physical therapy  Continue ibuprofen as needed  Continue restricted duty  Follow-up 3 weeks, if improvement continues will likely released to full duty    Disability Information   Status: Released to Restricted Duty    From:  5/8/2019  Through: 5/29/2019 Restrictions are: Temporary   Physical Restrictions   Sitting:    Standing:   Stooping:    Bending:      Squatting:    Walking:    Climbing:    Pushing:      Pulling:    Other:    Reaching Above Shoulder (L):   Reaching Above Shoulder (R): < or = 2 hrs/day     Reaching Below Shoulder (L):    Reaching Below Shoulder (R):      Not to exceed Weight Limits   Carrying(hrs):   Weight Limit(lb): < or = to 10 pounds Lifting(hrs):   Weight  Limit(lb): < or = to 10 pounds   Comments: 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 Restrictions apply to right arm.     Repetitive Actions   Hands: i.e. Fine Manipulations from Grasping:     Feet: i.e. Operating Foot Controls:     Driving / Operate Machinery:     Physician Name: Rene Daniels D.O. Physician Signature: [email or web address] DANIELS D.O. e-Signature: Dr. Alexandre Ramirez, Medical Director   Clinic Name / Location: [address],   [address] [place] [address] Clinic Phone Number: Dept: [phone]   Appointment Time: 4:15 Pm Visit Start Time: 4:25 PM   Check-In Time:  4:21 Pm Visit Discharge Time:  4:42 PM   Original-Treating Physician or Chiropractor    Page 2-Insurer/TPA    Page 3-Employer    Page 4-Employee

## 2019-05-28 ENCOUNTER — TELEPHONE (OUTPATIENT)
Dept: OCCUPATIONAL MEDICINE | Facility: CLINIC | Age: 29
End: 2019-05-28

## 2019-05-29 ENCOUNTER — OCCUPATIONAL MEDICINE (OUTPATIENT)
Dept: OCCUPATIONAL MEDICINE | Facility: CLINIC | Age: 29
End: 2019-05-29
Payer: COMMERCIAL

## 2019-05-29 VITALS
TEMPERATURE: 97.8 F | RESPIRATION RATE: 14 BRPM | HEIGHT: 71 IN | HEART RATE: 87 BPM | WEIGHT: 238 LBS | SYSTOLIC BLOOD PRESSURE: 114 MMHG | BODY MASS INDEX: 33.32 KG/M2 | OXYGEN SATURATION: 99 % | DIASTOLIC BLOOD PRESSURE: 86 MMHG

## 2019-05-29 DIAGNOSIS — S46.912D STRAIN OF LEFT SHOULDER, SUBSEQUENT ENCOUNTER: ICD-10-CM

## 2019-05-29 PROCEDURE — 99213 OFFICE O/P EST LOW 20 MIN: CPT | Performed by: PREVENTIVE MEDICINE

## 2019-05-29 ASSESSMENT — ENCOUNTER SYMPTOMS
TINGLING: 0
SENSORY CHANGE: 0

## 2019-05-29 NOTE — LETTER
[address],   [address] [place] [address]  Phone:  [phone] - Fax:  [phone]   [facility] Progress Report and Disability Certification  Date of Service: 5/29/2019   No Show:  No  Date / Time of Next Visit: 6/12/2019 415pm   Claim Information   Patient Name: Rich Brown  Claim Number:     Employer: [employer]  Date of Injury: 4/8/2019     Insurer / TPA: [employer]  ID / SSN:     Occupation:   Diagnosis: The encounter diagnosis was Strain of left shoulder, subsequent encounter.    Medical Information   Related to Industrial Injury? Yes    Subjective Complaints:  DOI 4/8/2019: 29 yo male presents with left shoulder strain. Patient works for [employer] and was trying to pull the door on the back of the truck down had a sudden pain in the left shoulder.  Patient notes overall good improvement with physical therapy.  He is attended 3 sessions which of help.  Continues with some pain especially with abduction.  Continue to work light duty.   Objective Findings: Left shoulder: No gross deformity.  Minimal tenderness anterior GH and bicipital groove.  Range of motion within normal limits, slight pain with abduction.  Strength intact.      MRI left shoulder: Normal MRI of shoulder.  No rotator cuff tear or labral tear.  Dr. Alonso   Pre-Existing Condition(s):     Assessment:   Condition Same    Status: Discharged /  MMI  Permanent Disability:No    Plan:      Diagnostics:      Comments:  Continue physical therapy  OTC ibuprofen as needed  Follow-up 2 weeks    Disability Information   Status: Released to Restricted Duty    From:  5/29/2019  Through: 6/12/2019 Restrictions are: Temporary   Physical Restrictions   Sitting:    Standing:    Stooping:    Bending:      Squatting:    Walking:    Climbing:    Pushing:      Pulling:    Other:    Reaching Above Shoulder (L):   Reaching Above Shoulder (R): < or = 2  hrs/day     Reaching Below Shoulder (L):    Reaching Below Shoulder (R):      Not to exceed Weight Limits   Carrying(hrs):   Weight Limit(lb): < or = to 10 pounds Lifting(hrs):   Weight  Limit(lb): < or = to 10 pounds   Comments: 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 Restrictions apply to right arm.    Repetitive Actions   Hands: i.e. Fine Manipulations from Grasping:     Feet: i.e. Operating Foot Controls:     Driving / Operate Machinery:     Physician Name: Rene Alonso D.O. Physician Signature: RENE Leach D.O. e-Signature: Dr. Alexandre Ramirez, Medical Director   Clinic Name / Location: [address],   [address] [place] [address] Clinic Phone Number: Dept: [phone]   Appointment Time: 4:15 Pm Visit Start Time: 4:08 PM   Check-In Time:  4:04 Pm Visit Discharge Time:  0441pm   Original-Treating Physician or Chiropractor    Page 2-Insurer/TPA    Page 3-Employer    Page 4-Employee

## 2019-05-29 NOTE — PROGRESS NOTES
"Subjective:      Rich Brown is a 28 y.o. male who presents with Follow-Up (WC DOI 4/8/19 Shoulder (L) - feeling better)      DOI 4/8/2019: 29 yo male presents with left shoulder strain. Patient works for [employer] and was trying to pull the door on the back of the truck down had a sudden pain in the left shoulder.  Patient notes overall good improvement with physical therapy.  He is attended 3 sessions which of help.  Continues with some pain especially with abduction.  Continue to work light duty.     HPI    Review of Systems   Neurological: Negative for tingling and sensory change.     SOCH[employer]: Works as a  at [employer]  FH: No pertinent family history to this problem.       Objective:     /86   Pulse 87   Temp 36.6 °C (97.8 °F)   Resp 14   Ht 1.803 m (5' 11\")   Wt 108 kg (238 lb)   SpO2 99%   BMI 33.19 kg/m²      Physical Exam   Constitutional: He is oriented to person, place, and time. He appears well-developed and well-nourished.   Cardiovascular: Normal rate.    Pulmonary/Chest: Effort normal.   Neurological: He is alert and oriented to person, place, and time.   Skin: Skin is warm and dry.   Psychiatric: He has a normal mood and affect. His behavior is normal. Judgment normal.       Left shoulder: No gross deformity.  Minimal tenderness anterior GH and bicipital groove.  Range of motion within normal limits, slight pain with abduction.  Strength intact.      MRI left shoulder: Normal MRI of shoulder.  No rotator cuff tear or labral tear.  Dr. Alonso       Assessment/Plan:     1. Strain of left shoulder, subsequent encounter  Continue physical therapy  OTC ibuprofen as needed  Follow-up 2 weeks      "

## 2019-06-11 ENCOUNTER — TELEPHONE (OUTPATIENT)
Dept: OCCUPATIONAL MEDICINE | Facility: CLINIC | Age: 29
End: 2019-06-11

## 2019-06-12 ENCOUNTER — OCCUPATIONAL MEDICINE (OUTPATIENT)
Dept: OCCUPATIONAL MEDICINE | Facility: CLINIC | Age: 29
End: 2019-06-12
Payer: COMMERCIAL

## 2019-06-12 VITALS
HEIGHT: 71 IN | WEIGHT: 238 LBS | HEART RATE: 106 BPM | TEMPERATURE: 98.7 F | RESPIRATION RATE: 14 BRPM | DIASTOLIC BLOOD PRESSURE: 88 MMHG | SYSTOLIC BLOOD PRESSURE: 122 MMHG | OXYGEN SATURATION: 98 % | BODY MASS INDEX: 33.32 KG/M2

## 2019-06-12 DIAGNOSIS — S46.912D STRAIN OF LEFT SHOULDER, SUBSEQUENT ENCOUNTER: ICD-10-CM

## 2019-06-12 PROCEDURE — 99212 OFFICE O/P EST SF 10 MIN: CPT | Performed by: PREVENTIVE MEDICINE

## 2019-06-12 ASSESSMENT — PAIN SCALES - GENERAL: PAINLEVEL: NO PAIN

## 2019-06-12 NOTE — PROGRESS NOTES
"Subjective:      Rich Brown is a 28 y.o. male who presents with Follow-Up (WC DOI 4/8/19 Shoulder (L) - Better - RM 16)      DOI 4/8/2019: 27 yo male presents with left shoulder strain. Patient works for [employer] and was trying to pull the door on the back of the truck down had a sudden pain in the left shoulder.  Patient states that overall left shoulder pain is much improved.  Only has minimal soreness.  He has one remaining physical therapy visit.  Feels ready for release and full duty.     HPI    ROS       Objective:     /88   Pulse (!) 106   Temp 37.1 °C (98.7 °F) (Temporal)   Resp 14   Ht 1.803 m (5' 11\")   Wt 108 kg (238 lb)   SpO2 98%   BMI 33.19 kg/m²      Physical Exam    Left shoulder: No gross deformity.  Full range of motion.     MRI left shoulder: Normal MRI of shoulder.  No rotator cuff tear or labral tear.       Assessment/Plan:     1. Strain of left shoulder, subsequent encounter  Finish remaining physical therapy  Full duty  Follow-up as needed      "

## 2019-06-12 NOTE — LETTER
[address],   [address] [place] [address]  Phone:  [phone] - Fax:  [phone]   [facility] Progress Report and Disability Certification  Date of Service: 6/12/2019   No Show:  No  Date / Time of Next Visit:  Release from care   Claim Information   Patient Name: Rich Brown  Claim Number:     Employer: [employer]  Date of Injury: 4/8/2019     Insurer / TPA: [employer]  ID / SSN:     Occupation:   Diagnosis: The encounter diagnosis was Strain of left shoulder, subsequent encounter.    Medical Information   Related to Industrial Injury? Yes    Subjective Complaints:  DOI 4/8/2019: 27 yo male presents with left shoulder strain. Patient works for [employer] and was trying to pull the door on the back of the truck down had a sudden pain in the left shoulder.  Patient states that overall left shoulder pain is much improved.  Only has minimal soreness.  He has one remaining physical therapy visit.  Feels ready for release and full duty.   Objective Findings: Left shoulder: No gross deformity.  Full range of motion.     MRI left shoulder: Normal MRI of shoulder.  No rotator cuff tear or labral tear.   Pre-Existing Condition(s):     Assessment:   Condition Improved    Status: Discharged /  MMI  Permanent Disability:No    Plan:      Diagnostics:      Comments:  Finish remaining physical therapy  Full duty  Follow-up as needed    Disability Information   Status: Released to Full Duty    From:  6/12/2019  Through:   Restrictions are:     Physical Restrictions   Sitting:    Standing:    Stooping:    Bending:      Squatting:    Walking:    Climbing:    Pushing:      Pulling:    Other:    Reaching Above Shoulder (L):   Reaching Above Shoulder (R):       Reaching Below Shoulder (L):    Reaching Below Shoulder (R):      Not to exceed Weight Limits   Carrying(hrs):   Weight Limit(lb):   Lifting(hrs):   Weight   Limit(lb):     Comments:      Repetitive Actions   Hands: i.e. Fine Manipulations from Grasping:     Feet: i.e. Operating Foot Controls:     Driving / Operate Machinery:     Physician Name: Rene Alonso D.O. Physician Signature: RedTARENE BROWN D.O. e-Signature: Dr. Alexandre Ramirez, Medical Director   Clinic Name / Location: [address],   [address]  [place] [address] Clinic Phone Number: Dept: [phone]   Appointment Time: 4:15 Pm Visit Start Time: 4:17 PM   Check-In Time:  4:16 Pm Visit Discharge Time: 4:29 PM   Original-Treating Physician or Chiropractor    Page 2-Insurer/TPA    Page 3-Employer    Page 4-Employee

## 2020-07-12 ENCOUNTER — OFFICE VISIT (OUTPATIENT)
Dept: URGENT CARE | Facility: CLINIC | Age: 30
End: 2020-07-12

## 2020-07-12 VITALS
SYSTOLIC BLOOD PRESSURE: 130 MMHG | BODY MASS INDEX: 33.32 KG/M2 | RESPIRATION RATE: 16 BRPM | HEIGHT: 71 IN | HEART RATE: 120 BPM | TEMPERATURE: 98.4 F | DIASTOLIC BLOOD PRESSURE: 90 MMHG | WEIGHT: 238 LBS | OXYGEN SATURATION: 98 %

## 2020-07-12 DIAGNOSIS — R00.0 TACHYCARDIA: ICD-10-CM

## 2020-07-12 PROCEDURE — 99214 OFFICE O/P EST MOD 30 MIN: CPT | Performed by: FAMILY MEDICINE

## 2020-07-13 NOTE — PROGRESS NOTES
Chief Complaint:    Chief Complaint   Patient presents with   • Other     evaluation for physical, check on elevated heart rate       History of Present Illness:    This is a new problem. He was to do a pre-employment physical but his heart rate was too high, so they said he could not proceed with the work physical. The physical will involve lifting 20 pounds. He does not have history of heart problems. He was hoping to get cleared to do the work pre-employment physical with the main issue being fast heart rate.      Review of Systems:    Constitutional: Negative for fever, chills, and diaphoresis.   Eyes: Negative for change in vision, photophobia, pain, redness, and discharge.  ENT: Negative for ear pain, ear discharge, hearing loss, tinnitus, nasal congestion, nosebleeds, and sore throat.    Respiratory: Negative for cough, hemoptysis, sputum production, shortness of breath, wheezing, and stridor.    Cardiovascular: See HPI.  Gastrointestinal: Negative for abdominal pain, nausea, vomiting, diarrhea, constipation, blood in stool, and melena.   Genitourinary: Negative for dysuria, urinary urgency, urinary frequency, hematuria, and flank pain.   Musculoskeletal: Negative for myalgias, joint pain, neck pain, and back pain.   Skin: Negative for rash and itching.   Neurological: Negative for dizziness, tingling, tremors, sensory change, speech change, focal weakness, seizures, loss of consciousness, and headaches.   Endo: Negative for polydipsia.   Heme: Does not bruise/bleed easily.   Psychiatric/Behavioral: Negative for depression, suicidal ideas, hallucinations, memory loss and substance abuse. The patient is not nervous/anxious and does not have insomnia.        Past Medical History:    History reviewed. No pertinent past medical history.    Past Surgical History:    Past Surgical History:   Procedure Laterality Date   • ARCH BAR REMOVAL OR REPAIR  10/28/08    Performed by ORLY MON at [facility]   • ARCH BAR APPLICATION  9/24/08    Performed by KAREY MILES at [facility]   • CLOSED REDUCTION  9/24/08    Performed by KAREY MILES at [facility]   • ACL REPAIR      left knee     Social History:    Social History     Socioeconomic History   • Marital status: Single     Spouse name: Not on file   • Number of children: Not on file   • Years of education: Not on file   • Highest education level: Not on file   Occupational History   • Not on file   Social Needs   • Financial resource strain: Not on file   • Food insecurity     Worry: Not on file     Inability: Not on file   • Transportation needs     Medical: Not on file     Non-medical: Not on file   Tobacco Use   • Smoking status: Current Every Day Smoker     Packs/day: 0.50     Years: 6.00     Pack years: 3.00     Types: Cigarettes   • Smokeless tobacco: Never Used   Substance and Sexual Activity   • Alcohol use: No   • Drug use: No   • Sexual activity: Not on file   Lifestyle   • Physical activity     Days per week: Not on file     Minutes per session: Not on file   • Stress: Not on file   Relationships   • Social connections     Talks on phone: Not on file     Gets together: Not on file     Attends Spiritism service: Not on file     Active member of club or organization: Not on file     Attends meetings of clubs or organizations: Not on file     Relationship status: Not on file   • Intimate partner violence     Fear of current or ex partner: Not on file     Emotionally abused: Not on file     Physically abused: Not on file     Forced sexual activity: Not on file   Other Topics Concern   • Not on file   Social History Narrative   • Not on file     Family History:    History reviewed. No pertinent family history.    Medications:    No current outpatient medications on file prior to visit.     No current facility-administered medications on file prior to visit.      Allergies:    No Known  "Allergies      Vitals:    Vitals:    07/12/20 2129   BP: 130/90   Patient Position: Sitting   Pulse: (!) 120   Resp: 16   Temp: 36.9 °C (98.4 °F)   TempSrc: Temporal   SpO2: 98%   Weight: 108 kg (238 lb)   Height: 1.803 m (5' 11\")       Physical Exam:    Constitutional: Vital signs reviewed. Appears well-developed and well-nourished. No acute distress.   Eyes: Sclera white, conjunctivae clear.   ENT: External ears normal. Hearing normal.   Neck: Neck supple.   Cardiovascular: Tachycardic. Regular rhythm. No murmur.  Pulmonary/Chest: Respirations non-labored. Clear to auscultation bilaterally.  Musculoskeletal: Normal gait. Normal range of motion. No muscular atrophy or weakness.  Neurological: Alert and oriented to person, place, and time. Muscle tone normal. Coordination normal.   Skin: No rashes or lesions. Warm, dry, normal turgor.  Psychiatric: Normal mood and affect. Behavior is normal. Judgment and thought content normal.       Diagnostics:    EKG x 2: Sinus tachycardia 103 and 104.    EKGs reviewed with him and copies to him.      Assessment / Plan:    1. Tachycardia  - EKG  - REFERRAL TO CARDIOLOGY General Cardiology MD      Discussed with him DDX, management options, and risks, benefits, and alternatives to treatment plan agreed upon.    Continuous pulse ox monitoring in clinic showed pulse to consistently stay at 100 or greater and due to this I advised I cannot clear him from cardiac standpoint to do the work physical.    I have ordered a referral to Cardiology for this issue.    He will return to urgent care if needed.  "

## 2021-02-01 ENCOUNTER — HOSPITAL ENCOUNTER (OUTPATIENT)
Facility: MEDICAL CENTER | Age: 31
End: 2021-02-01
Attending: NURSE PRACTITIONER
Payer: COMMERCIAL

## 2021-02-01 ENCOUNTER — OFFICE VISIT (OUTPATIENT)
Dept: URGENT CARE | Facility: PHYSICIAN GROUP | Age: 31
End: 2021-02-01
Payer: COMMERCIAL

## 2021-02-01 VITALS
RESPIRATION RATE: 20 BRPM | OXYGEN SATURATION: 100 % | DIASTOLIC BLOOD PRESSURE: 76 MMHG | TEMPERATURE: 96.7 F | BODY MASS INDEX: 33.6 KG/M2 | SYSTOLIC BLOOD PRESSURE: 110 MMHG | HEIGHT: 71 IN | HEART RATE: 77 BPM | WEIGHT: 240 LBS

## 2021-02-01 DIAGNOSIS — J02.9 SORE THROAT: ICD-10-CM

## 2021-02-01 DIAGNOSIS — R53.83 OTHER FATIGUE: ICD-10-CM

## 2021-02-01 DIAGNOSIS — R05.9 COUGH: ICD-10-CM

## 2021-02-01 DIAGNOSIS — G47.01 INSOMNIA DUE TO MEDICAL CONDITION: ICD-10-CM

## 2021-02-01 DIAGNOSIS — M79.10 MYALGIA: ICD-10-CM

## 2021-02-01 LAB
INT CON NEG: NEGATIVE
INT CON POS: POSITIVE
S PYO AG THROAT QL: NORMAL

## 2021-02-01 PROCEDURE — 99213 OFFICE O/P EST LOW 20 MIN: CPT | Performed by: NURSE PRACTITIONER

## 2021-02-01 PROCEDURE — U0003 INFECTIOUS AGENT DETECTION BY NUCLEIC ACID (DNA OR RNA); SEVERE ACUTE RESPIRATORY SYNDROME CORONAVIRUS 2 (SARS-COV-2) (CORONAVIRUS DISEASE [COVID-19]), AMPLIFIED PROBE TECHNIQUE, MAKING USE OF HIGH THROUGHPUT TECHNOLOGIES AS DESCRIBED BY CMS-2020-01-R: HCPCS

## 2021-02-01 PROCEDURE — U0005 INFEC AGEN DETEC AMPLI PROBE: HCPCS

## 2021-02-01 PROCEDURE — 87880 STREP A ASSAY W/OPTIC: CPT | Performed by: NURSE PRACTITIONER

## 2021-02-01 RX ORDER — CODEINE PHOSPHATE AND GUAIFENESIN 10; 100 MG/5ML; MG/5ML
5 SOLUTION ORAL EVERY 6 HOURS PRN
Qty: 118 ML | Refills: 0 | Status: SHIPPED | OUTPATIENT
Start: 2021-02-01 | End: 2021-02-06

## 2021-02-01 NOTE — LETTER
February 1, 2021        Rich Rizzo Kevin    Your employee/student was seen in our clinic today.  A concern for COVID-19 has been identified and testing is in progress.  Please excuse his absences from work on the dates of 1/24/2021 due to current due to a concern for COVID.    We are asking you to excuse absences while following self-isolation protocol per Center for Disease Control (CDC) guidelines.  Your employee/student (and/or their parent) will be able to access test results through our electronic delivery system called [employer].     If the results of testing are negative, and once there has been no fever (temperature >100.4 F) for at least 72 hours without treatment, and no vomiting or diarrhea for at least 48 hours, then return to work/school is approved.    If the results of testing are positive then your employee/student will be contacted by the [facility] or [facility] for further instructions on duration of self-isolation and return to work protocol. In general, this will also follow the CDC guidelines with a minimum of 10 days from the onset of symptoms and without fever, vomiting, or diarrhea as above.     In general, repeat testing is not necessary and not offered through our [facility].     This is the only note that will be provided from [facility] for this visit.  Your employee/student will require an appointment with a primary care provider if FMLA or disability forms are required.    If you have any questions please do not hesitate to call me at the phone number listed below.    Sincerely,      Rena Guzman, ILEANA  [phone]  Electronically Signed

## 2021-02-02 DIAGNOSIS — G47.01 INSOMNIA DUE TO MEDICAL CONDITION: ICD-10-CM

## 2021-02-02 DIAGNOSIS — M79.10 MYALGIA: ICD-10-CM

## 2021-02-02 DIAGNOSIS — R05.9 COUGH: ICD-10-CM

## 2021-02-02 DIAGNOSIS — J02.9 SORE THROAT: ICD-10-CM

## 2021-02-02 DIAGNOSIS — R53.83 OTHER FATIGUE: ICD-10-CM

## 2021-02-02 LAB — COVID ORDER STATUS COVID19: NORMAL

## 2021-02-03 LAB
SARS-COV-2 RNA RESP QL NAA+PROBE: NOTDETECTED
SPECIMEN SOURCE: NORMAL

## 2021-02-06 ASSESSMENT — ENCOUNTER SYMPTOMS
EYE PAIN: 0
NAUSEA: 0
FEVER: 0
WEAKNESS: 0
ABDOMINAL PAIN: 0
ORTHOPNEA: 0
SPUTUM PRODUCTION: 0
CHILLS: 0
VOMITING: 0
WHEEZING: 0
SHORTNESS OF BREATH: 0
COUGH: 1
NERVOUS/ANXIOUS: 0
SORE THROAT: 1
MYALGIAS: 1
HEADACHES: 0
DIZZINESS: 0

## 2021-02-06 NOTE — PROGRESS NOTES
Subjective:   Rich Brown is a 30 y.o. male who presents for Cough (x1wk congestion had neg covid test)       Cough  This is a new problem. The current episode started in the past 7 days. The problem has been unchanged. The problem occurs every few minutes. The cough is non-productive. Associated symptoms include myalgias and a sore throat. Pertinent negatives include no chest pain, chills, ear pain, fever, headaches, rash, shortness of breath or wheezing. Nothing aggravates the symptoms. Risk factors: none. He has tried rest and OTC cough suppressant for the symptoms. The treatment provided no relief.     Pt presents for evaluation of a new problem, reports a week history of cough, fatigue, myalgia and insomnia due to coughing at night.  Had negative Covid test 2 weeks ago, but has possibly had known ill contacts.     Review of Systems   Constitutional: Positive for malaise/fatigue. Negative for chills and fever.   HENT: Positive for sore throat. Negative for congestion and ear pain.    Eyes: Negative for pain.   Respiratory: Positive for cough. Negative for sputum production, shortness of breath and wheezing.    Cardiovascular: Negative for chest pain, orthopnea and leg swelling.   Gastrointestinal: Negative for abdominal pain, nausea and vomiting.   Genitourinary: Negative for dysuria.   Musculoskeletal: Positive for myalgias.   Skin: Negative for rash.   Neurological: Negative for dizziness, weakness and headaches.   Psychiatric/Behavioral: The patient is not nervous/anxious. Insomnia: r/t coughing.    All other systems reviewed and are negative.      MEDS:   Current Outpatient Medications:   •  guaifenesin-codeine (ROBITUSSIN AC) Solution oral solution, Take 5 mL by mouth every 6 hours as needed for Cough for up to 5 days., Disp: 118 mL, Rfl: 0  ALLERGIES: No Known Allergies    Patient's PMH, SocHx, SurgHx, FamHx, Drug allergies and medications were reviewed.     Objective:   /76   Pulse 77    "Temp 35.9 °C (96.7 °F) (Temporal)   Resp 20   Ht 1.803 m (5' 11\")   Wt 109 kg (240 lb)   SpO2 100%   BMI 33.47 kg/m²     Physical Exam  Vitals signs and nursing note reviewed.   Constitutional:       General: He is awake.      Appearance: Normal appearance. He is well-developed and normal weight.   HENT:      Head: Normocephalic and atraumatic.      Right Ear: Tympanic membrane, ear canal and external ear normal.      Left Ear: Tympanic membrane, ear canal and external ear normal.      Nose: Nose normal.      Mouth/Throat:      Lips: Pink.      Mouth: Mucous membranes are moist.      Pharynx: Oropharynx is clear. Uvula midline. Posterior oropharyngeal erythema present.   Eyes:      Extraocular Movements: Extraocular movements intact.      Conjunctiva/sclera: Conjunctivae normal.      Pupils: Pupils are equal, round, and reactive to light.   Neck:      Musculoskeletal: Full passive range of motion without pain, normal range of motion and neck supple.      Thyroid: No thyromegaly.      Trachea: Trachea normal.   Cardiovascular:      Rate and Rhythm: Normal rate and regular rhythm.      Pulses: Normal pulses.      Heart sounds: Normal heart sounds, S1 normal and S2 normal.   Pulmonary:      Effort: Pulmonary effort is normal. No respiratory distress.      Breath sounds: Normal breath sounds. No wheezing, rhonchi or rales.      Comments: Productive cough noted often during visit.  Abdominal:      General: Bowel sounds are normal.      Palpations: Abdomen is soft.   Musculoskeletal: Normal range of motion.   Lymphadenopathy:      Cervical: No cervical adenopathy.   Skin:     General: Skin is warm and dry.      Capillary Refill: Capillary refill takes less than 2 seconds.   Neurological:      General: No focal deficit present.      Mental Status: He is alert and oriented to person, place, and time.      Gait: Gait is intact.   Psychiatric:         Attention and Perception: Attention and perception normal.         " Mood and Affect: Mood normal.         Speech: Speech normal.         Behavior: Behavior normal. Behavior is cooperative.         Thought Content: Thought content normal.         Judgment: Judgment normal.         Assessment/Plan:   Assessment    1. Cough  - guaifenesin-codeine (ROBITUSSIN AC) Solution oral solution; Take 5 mL by mouth every 6 hours as needed for Cough for up to 5 days.  Dispense: 118 mL; Refill: 0  - SARS-CoV-2 PCR (24 hour In-House): Collect NP swab in VTM; Future  - POCT Rapid Strep A    2. Insomnia due to medical condition  - guaifenesin-codeine (ROBITUSSIN AC) Solution oral solution; Take 5 mL by mouth every 6 hours as needed for Cough for up to 5 days.  Dispense: 118 mL; Refill: 0  - SARS-CoV-2 PCR (24 hour In-House): Collect NP swab in VTM; Future    3. Sore throat  - SARS-CoV-2 PCR (24 hour In-House): Collect NP swab in VTM; Future  - POCT Rapid Strep A    4. Myalgia  - SARS-CoV-2 PCR (24 hour In-House): Collect NP swab in VTM; Future  - POCT Rapid Strep A    5. Other fatigue  - SARS-CoV-2 PCR (24 hour In-House): Collect NP swab in VTM; Future  - POCT Rapid Strep A    Vital signs stable at today's acute urgent care visit.  Reviewed test results that were completed in the clinic.  Discussed management options (risks, benefits, and alternatives to treatment). Begin medications as listed, on a strict PRN basis.  Reccomend OTC decongestant. Will send for Covid testing, advised to continue to self quarantine until test results return.    Advised the patient to follow-up with the primary care provider for recheck, reevaluation, and/or consideration of further management if necessary. Return to urgent care with any worsening symptoms or if there is no improvement in their current condition. Red flags discussed and indications to immediately call 911 or present to the Emergency Department.  All questions were encouraged and answered to the patient's satisfaction and understanding, and they agree to  the plan of care.     I personally reviewed prior external notes and test results pertinent to today's visit.  I have independently reviewed and interpreted all diagnostics ordered during this urgent care acute visit. Time spent evaluating this patient was a minimum of 30 minutes and includes preparing for visit, counseling/education, exam and evaluation, obtaining history, independent interpretation and ordering lab/test/procedures.      Please note that this dictation was created using voice recognition software. I have made a reasonable attempt to correct obvious errors, but I expect that there are errors of grammar and possibly content that I did not discover before finalizing the note.

## 2021-02-07 ENCOUNTER — OFFICE VISIT (OUTPATIENT)
Dept: URGENT CARE | Facility: PHYSICIAN GROUP | Age: 31
End: 2021-02-07
Payer: COMMERCIAL

## 2021-02-07 VITALS
HEIGHT: 71 IN | BODY MASS INDEX: 33.6 KG/M2 | SYSTOLIC BLOOD PRESSURE: 132 MMHG | RESPIRATION RATE: 16 BRPM | HEART RATE: 103 BPM | DIASTOLIC BLOOD PRESSURE: 82 MMHG | WEIGHT: 240 LBS | OXYGEN SATURATION: 98 % | TEMPERATURE: 96.7 F

## 2021-02-07 DIAGNOSIS — S29.019A STRAIN OF MUSCLE AT THORAX LEVEL: ICD-10-CM

## 2021-02-07 PROCEDURE — 99213 OFFICE O/P EST LOW 20 MIN: CPT | Performed by: FAMILY MEDICINE

## 2021-02-07 RX ORDER — CYCLOBENZAPRINE HCL 5 MG
5-10 TABLET ORAL 3 TIMES DAILY PRN
Qty: 30 TAB | Refills: 0 | Status: SHIPPED | OUTPATIENT
Start: 2021-02-07 | End: 2022-01-18

## 2021-02-07 ASSESSMENT — ENCOUNTER SYMPTOMS
VOMITING: 0
BACK PAIN: 1
MYALGIAS: 1
FEVER: 0

## 2021-02-07 NOTE — LETTER
February 7, 2021      To Whom It May Concern:            This is confirmation that Rich Brown attended his scheduled appointment with Onur Corcoran M.D. on 2/07/21.  Please excuse him from work.  He is anticipated to be well enough to return by 2/11/21 or sooner if improving faster than expected.  Thank you for making appropriate accommodations as he recovers.             If you have any questions please do not hesitate to call me at the phone number listed below.      Sincerely,          Onur Corcoran M.D.  [phone]

## 2021-02-19 ENCOUNTER — OFFICE VISIT (OUTPATIENT)
Dept: URGENT CARE | Facility: PHYSICIAN GROUP | Age: 31
End: 2021-02-19
Payer: COMMERCIAL

## 2021-02-19 ENCOUNTER — HOSPITAL ENCOUNTER (OUTPATIENT)
Dept: RADIOLOGY | Facility: MEDICAL CENTER | Age: 31
End: 2021-02-19
Attending: PHYSICIAN ASSISTANT
Payer: COMMERCIAL

## 2021-02-19 VITALS
HEIGHT: 71 IN | WEIGHT: 240 LBS | OXYGEN SATURATION: 98 % | SYSTOLIC BLOOD PRESSURE: 108 MMHG | TEMPERATURE: 97.8 F | HEART RATE: 91 BPM | DIASTOLIC BLOOD PRESSURE: 80 MMHG | RESPIRATION RATE: 20 BRPM | BODY MASS INDEX: 33.6 KG/M2

## 2021-02-19 DIAGNOSIS — S29.019A STRAIN OF MUSCLE AT THORAX LEVEL: ICD-10-CM

## 2021-02-19 PROBLEM — Z98.890 HISTORY OF RECONSTRUCTION OF ANTERIOR CRUCIATE LIGAMENT TEAR: Status: ACTIVE | Noted: 2021-02-19

## 2021-02-19 PROBLEM — Z72.0 TOBACCO USER: Status: ACTIVE | Noted: 2021-02-19

## 2021-02-19 LAB
APPEARANCE UR: CLEAR
BILIRUB UR STRIP-MCNC: NORMAL MG/DL
COLOR UR AUTO: YELLOW
GLUCOSE UR STRIP.AUTO-MCNC: NORMAL MG/DL
KETONES UR STRIP.AUTO-MCNC: NORMAL MG/DL
LEUKOCYTE ESTERASE UR QL STRIP.AUTO: NORMAL
NITRITE UR QL STRIP.AUTO: NORMAL
PH UR STRIP.AUTO: 6 [PH] (ref 5–8)
PROT UR QL STRIP: NORMAL MG/DL
RBC UR QL AUTO: NORMAL
SP GR UR STRIP.AUTO: 1.01
UROBILINOGEN UR STRIP-MCNC: 0.2 MG/DL

## 2021-02-19 PROCEDURE — 99214 OFFICE O/P EST MOD 30 MIN: CPT | Performed by: PHYSICIAN ASSISTANT

## 2021-02-19 PROCEDURE — 71046 X-RAY EXAM CHEST 2 VIEWS: CPT

## 2021-02-19 PROCEDURE — 81002 URINALYSIS NONAUTO W/O SCOPE: CPT | Performed by: PHYSICIAN ASSISTANT

## 2021-02-19 RX ORDER — NAPROXEN 500 MG/1
500 TABLET ORAL 2 TIMES DAILY WITH MEALS
Qty: 30 TABLET | Refills: 0 | Status: SHIPPED | OUTPATIENT
Start: 2021-02-19 | End: 2022-01-18

## 2021-02-19 ASSESSMENT — PAIN SCALES - GENERAL: PAINLEVEL: 9=SEVERE PAIN

## 2021-02-20 ASSESSMENT — ENCOUNTER SYMPTOMS
NAUSEA: 0
SHORTNESS OF BREATH: 0
HEADACHES: 0
FLANK PAIN: 1
VOMITING: 0
SORE THROAT: 0
BACK PAIN: 1
CONSTIPATION: 0
CHILLS: 0
COUGH: 0
FEVER: 0
EYE PAIN: 0
ABDOMINAL PAIN: 0
DIARRHEA: 0
MYALGIAS: 0

## 2021-02-21 NOTE — PROGRESS NOTES
Subjective:   Rich Brown is a 30 y.o. male who presents for Back Pain (R mid back px, goes into R rib abd area, came in to   2/7, px not better)      HPI:  This is a pleasant 30-year-old male who returns to urgent care after being seen 2/7 for thoracic back pain.  He reports his right middle back pain is not significantly improved.  He has been using the Flexeril as indicated, he is try the gentle stretching.  He tried ibuprofen once or twice however the back pain remains.  He does have a very physical job.  He is primarily concerned as a family member indicated that his back pain might represent kidney stones.  He reports right-sided thoracic back pain focally by the right shoulder blade that occasionally radiates around the right scapular region and right chest wall.  He denies any shortness of breath.  He denies any gastrointestinal symptoms.  He denies any dysuria hematuria.  He has no history of kidney stones or other urogenital abnormality    Review of Systems   Constitutional: Negative for chills and fever.   HENT: Negative for congestion, ear pain and sore throat.    Eyes: Negative for pain.   Respiratory: Negative for cough and shortness of breath.    Cardiovascular: Negative for chest pain.   Gastrointestinal: Negative for abdominal pain, constipation, diarrhea, nausea and vomiting.   Genitourinary: Positive for flank pain. Negative for dysuria.   Musculoskeletal: Positive for back pain. Negative for myalgias.   Skin: Negative for rash.   Neurological: Negative for headaches.       Medications:    • cyclobenzaprine  • diclofenac sodium Gel  • naproxen Tabs    Allergies: Patient has no known allergies.    Problem List: Rich Brown has History of reconstruction of anterior cruciate ligament tear and Tobacco user on their problem list.    Surgical History:  Past Surgical History:   Procedure Laterality Date   • ARCH BAR REMOVAL OR REPAIR  10/28/08    Performed by ORLY MON at  "SURGERY SAME DAY [facility] ORS   • ARCH BAR APPLICATION  9/24/08    Performed by KAREY MILES at SURGERY [facility] ORS   • CLOSED REDUCTION  9/24/08    Performed by KAREY MILES at SURGERY [facility] ORS   • ACL REPAIR      left knee       Past Social Hx: Rich Brown  reports that he has been smoking cigarettes. He has a 3.00 pack-year smoking history. He has never used smokeless tobacco. He reports that he does not drink alcohol and does not use drugs.     Past Family Hx:  Rich Brown family history is not on file.     Problem list, medications, and allergies reviewed by myself today in Epic.     Objective:     /80   Pulse 91   Temp 36.6 °C (97.8 °F) (Temporal)   Resp 20   Ht 1.803 m (5' 11\")   Wt 109 kg (240 lb)   SpO2 98%   BMI 33.47 kg/m²     Physical Exam  Vitals reviewed.   Constitutional:       Appearance: Normal appearance.   HENT:      Head: Normocephalic and atraumatic.      Right Ear: External ear normal.      Left Ear: External ear normal.      Nose: Nose normal.      Mouth/Throat:      Mouth: Mucous membranes are moist.   Eyes:      Conjunctiva/sclera: Conjunctivae normal.   Cardiovascular:      Rate and Rhythm: Normal rate and regular rhythm.      Heart sounds: Normal heart sounds.   Pulmonary:      Effort: Pulmonary effort is normal.      Breath sounds: Normal breath sounds.   Abdominal:      General: Abdomen is flat.      Palpations: Abdomen is soft.      Tenderness: There is no abdominal tenderness. There is no right CVA tenderness, left CVA tenderness, guarding or rebound.   Musculoskeletal:         General: Normal range of motion.      Comments: Focal tenderness over right paraspinal region as well as right rhomboid.  No obvious midline step-off, crepitus, or deformity.  Pain is easily reproduced with palpation as well as movements of the upper extremity.  Pain is weakly reproduced with rotation of the spine.  Nontender over the lateral rib cage as " well as with anterior posterior compression.   Skin:     General: Skin is warm and dry.      Capillary Refill: Capillary refill takes less than 2 seconds.   Neurological:      Mental Status: He is alert and oriented to person, place, and time.         Lab Results/POC Test Results   Results for orders placed or performed in visit on 02/19/21   POCT Urinalysis   Result Value Ref Range    POC Color yellow Negative    POC Appearance clear Negative    POC Leukocyte Esterase neg Negative    POC Nitrites neg Negative    POC Urobiligen 0.2 Negative (0.2) mg/dL    POC Protein neg Negative mg/dL    POC Urine PH 6.0 5.0 - 8.0    POC Blood neg Negative    POC Specific Gravity 1.015 <1.005 - >1.030    POC Ketones neg Negative mg/dL    POC Bilirubin neg Negative mg/dL    POC Glucose neg Negative mg/dL           RADIOLOGY RESULTS   DX-CHEST-2 VIEWS    Result Date: 2/19/2021 2/19/2021 5:56 PM HISTORY/REASON FOR EXAM:  Right-sided back pain/chest pain TECHNIQUE/EXAM DESCRIPTION AND NUMBER OF VIEWS: Two views of the chest. COMPARISON:  12/29/2016. FINDINGS: LUNGS: Clear. No effusions. PNEUMOTHORAX: None. LINES AND TUBES: None. MEDIASTINUM: No cardiomegaly. MUSCULOSKELETAL STRUCTURES: No acute displaced fracture.     No acute cardiopulmonary abnormality.             Assessment/Plan:     Diagnosis and associated orders:     1. Strain of muscle at thorax level  DX-CHEST-2 VIEWS    EKG - Clinic Performed    POCT Urinalysis    naproxen (NAPROSYN) 500 MG Tab    diclofenac sodium 1 % Gel    REFERRAL TO SPORTS MEDICINE      Comments/MDM:     • Patient's history and physical are consistent with musculoskeletal back pain.  I believe this patient has not been able to fully recover from what ever original strain he encountered.  Due to the patient's ongoing symptoms I thought it was reasonable to check for a pleural cause of his pain so I performed a chest x-ray, as well as an EKG.  His EKG was unremarkable with no ST or T wave changes.  No  evidence of ischemia.  No evidence of right heart strain.  No evidence of pericarditis or similar pathology.  Additionally his urinalysis did not show any red blood cells or signs concerning for kidney stones.  His pain pattern with dull pain that is reproduced with range of motion.  Is not consistent with nephrolithiasis.  At this point I recommended the patient more aggressively treat his presumed MSK pain with ice and/or heat therapy.  Naproxen.  And diclofenac gel.  The patient was very relieved after this assessment as he was very concerned about a kidney stone as he had a family member who had a very challenging diagnosis of a kidney stone.  I recommended ER precautions also return precautions and the patient demonstrated understanding         Differential diagnosis, natural history, supportive care, and indications for immediate follow-up discussed.    Advised the patient to follow-up with the primary care physician for recheck, reevaluation, and consideration of further management.    Please note that this dictation was created using voice recognition software. I have made a reasonable attempt to correct obvious errors, but I expect that there are errors of grammar and possibly content that I did not discover before finalizing the note.    This note was electronically signed by Juan Witt PA-C

## 2021-11-06 NOTE — PROGRESS NOTES
"Subjective:     Rich Brown is a 30 y.o. male who presents for Back Pain (mid back [id number] )    HPI  Pt presents for evaluation of an acute problem  Pt with mid back pain for the past 4 days   Pain stays central and does not radiate   Has intermittent times when the pain \"locks up\" his back and has stiffness  Has no associated numbness or tingling  Pain does not radiate to arms or legs    Currently ill with flu like illness   He was previously evaluated and had a negative strep as well as negative COVID-19 testing  Feels that his flulike illness is slowly improving  Patient thinks that his back pain could be associated with illness since he has not been exercising much the past several days and has been sitting/lying most of the day    Review of Systems   Constitutional: Negative for fever.   Gastrointestinal: Negative for vomiting.   Musculoskeletal: Positive for back pain and myalgias.   Skin: Negative for rash.       PMH:  has no past medical history on file.  MEDS: No current outpatient medications on file.  ALLERGIES: No Known Allergies  SURGHX:   Past Surgical History:   Procedure Laterality Date   • ARCH BAR REMOVAL OR REPAIR  10/28/08    Performed by ORLY MON at SURGERY SAME DAY [facility] ORS   • ARCH BAR APPLICATION  9/24/08    Performed by KAREY MILES at SURGERY [facility] ORS   • CLOSED REDUCTION  9/24/08    Performed by KAREY MILES at SURGERY [facility] ORS   • ACL REPAIR      left knee     SOCHX:  reports that he has been smoking cigarettes. He has a 3.00 pack-year smoking history. He has never used smokeless tobacco. He reports that he does not drink alcohol or use drugs.     Objective:   /82   Pulse (!) 103   Temp 35.9 °C (96.7 °F) (Temporal)   Resp 16   Ht 1.803 m (5' 11\")   Wt 109 kg (240 lb)   SpO2 98%   BMI 33.47 kg/m²     Physical Exam  Constitutional:       General: He is not in acute distress.     Appearance: He is well-developed. He is not " diaphoretic.   HENT:      Head: Normocephalic and atraumatic.   Pulmonary:      Effort: Pulmonary effort is normal.   Musculoskeletal:      Comments: Back:  General: No asymmetry, bruising, or erythema appreciated  ROM: flexion 90°, extension 30°, lateral bend 30°, lateral twist 30°  Palpation: No tenderness to palpation of spinous processes, no step-offs appreciated,+TTP in the thoracic paraspinal muscles, no significant scoliosis appreciated  Neuro: Sensation intact and equal bilaterally in LE's   Skin:     General: Skin is warm and dry.      Findings: No rash.   Neurological:      Mental Status: He is alert and oriented to person, place, and time.   Psychiatric:         Behavior: Behavior normal.         Thought Content: Thought content normal.         Judgment: Judgment normal.         Assessment/Plan:   Assessment    1. Strain of muscle at thorax level  - cyclobenzaprine (FLEXERIL) 5 mg tablet; Take 1-2 Tabs by mouth 3 times a day as needed for Moderate Pain.  Dispense: 30 Tab; Refill: 0    Patient with thoracic muscle strain.  This is likely partially myalgia from his flulike illness, but also strain from laying around due to said illness.  Reviewed supportive care measures including heat, stretching, NSAIDs, and was given a handout with exercises/stretches to do at home.  Was also given a prescription for Flexeril to be used at night if pain is interrupting his sleep.  Reviewed follow-up precautions and expected course of recovery.  Follow-up in the urgent care on an as-needed basis.     Yes

## 2022-01-18 ENCOUNTER — OFFICE VISIT (OUTPATIENT)
Dept: URGENT CARE | Facility: CLINIC | Age: 32
End: 2022-01-18
Payer: COMMERCIAL

## 2022-01-18 ENCOUNTER — HOSPITAL ENCOUNTER (OUTPATIENT)
Facility: MEDICAL CENTER | Age: 32
End: 2022-01-18
Attending: NURSE PRACTITIONER
Payer: COMMERCIAL

## 2022-01-18 VITALS
DIASTOLIC BLOOD PRESSURE: 78 MMHG | BODY MASS INDEX: 33.6 KG/M2 | HEART RATE: 86 BPM | RESPIRATION RATE: 16 BRPM | WEIGHT: 240 LBS | HEIGHT: 71 IN | SYSTOLIC BLOOD PRESSURE: 104 MMHG | TEMPERATURE: 97.5 F | OXYGEN SATURATION: 97 %

## 2022-01-18 DIAGNOSIS — Z20.822 CLOSE EXPOSURE TO COVID-19 VIRUS: ICD-10-CM

## 2022-01-18 DIAGNOSIS — M79.10 MYALGIA: ICD-10-CM

## 2022-01-18 DIAGNOSIS — R53.83 OTHER FATIGUE: ICD-10-CM

## 2022-01-18 DIAGNOSIS — R50.9 FEVER AND CHILLS: ICD-10-CM

## 2022-01-18 DIAGNOSIS — R05.9 COUGH: ICD-10-CM

## 2022-01-18 DIAGNOSIS — R19.7 DIARRHEA IN ADULT PATIENT: ICD-10-CM

## 2022-01-18 PROCEDURE — 99213 OFFICE O/P EST LOW 20 MIN: CPT | Mod: CS | Performed by: NURSE PRACTITIONER

## 2022-01-18 PROCEDURE — U0005 INFEC AGEN DETEC AMPLI PROBE: HCPCS

## 2022-01-18 PROCEDURE — U0003 INFECTIOUS AGENT DETECTION BY NUCLEIC ACID (DNA OR RNA); SEVERE ACUTE RESPIRATORY SYNDROME CORONAVIRUS 2 (SARS-COV-2) (CORONAVIRUS DISEASE [COVID-19]), AMPLIFIED PROBE TECHNIQUE, MAKING USE OF HIGH THROUGHPUT TECHNOLOGIES AS DESCRIBED BY CMS-2020-01-R: HCPCS

## 2022-01-18 NOTE — PROGRESS NOTES
"Subjective:   Rich Brown is a 31 y.o. male who presents for Coronavirus Screening, Body Aches (x1 week ), Fever, and Diarrhea       HPI  Pt presents for evaluation of a new problem, reports 6-day history of fatigue, fever and chills, diarrhea, myalgia, cough, and intermittent nausea with vomiting.  Patient reports several people within his workplace have been ill with COVID.  Patient is not COVID vaccinated.  Patient has not tried anything for his symptoms with the exception of sleep.    ROS  All other systems are negative except as documented above within HPI.      MEDS: No current outpatient medications on file.  ALLERGIES: No Known Allergies    Patient's PMH, SocHx, SurgHx, FamHx, Drug allergies and medications were reviewed.     Objective:   /78   Pulse 86   Temp 36.4 °C (97.5 °F) (Temporal)   Resp 16   Ht 1.803 m (5' 11\")   Wt 109 kg (240 lb)   SpO2 97%   BMI 33.47 kg/m²     Physical Exam  Vitals and nursing note reviewed.   Constitutional:       General: He is awake.      Appearance: Normal appearance. He is well-developed and normal weight.   HENT:      Head: Normocephalic and atraumatic.      Right Ear: Tympanic membrane, ear canal and external ear normal.      Left Ear: Tympanic membrane, ear canal and external ear normal.      Nose: Nose normal.      Mouth/Throat:      Lips: Pink.      Mouth: Mucous membranes are moist.      Pharynx: Oropharynx is clear. Uvula midline.   Eyes:      Extraocular Movements: Extraocular movements intact.      Conjunctiva/sclera: Conjunctivae normal.      Pupils: Pupils are equal, round, and reactive to light.   Neck:      Thyroid: No thyromegaly.      Trachea: Trachea normal.   Cardiovascular:      Rate and Rhythm: Normal rate and regular rhythm.      Pulses: Normal pulses.      Heart sounds: Normal heart sounds, S1 normal and S2 normal.   Pulmonary:      Effort: Pulmonary effort is normal. No respiratory distress.      Breath sounds: Normal breath " sounds. No wheezing, rhonchi or rales.   Abdominal:      General: Bowel sounds are normal.      Palpations: Abdomen is soft.   Musculoskeletal:         General: Normal range of motion.      Cervical back: Full passive range of motion without pain, normal range of motion and neck supple.   Lymphadenopathy:      Cervical: No cervical adenopathy.   Skin:     General: Skin is warm and dry.      Capillary Refill: Capillary refill takes less than 2 seconds.   Neurological:      General: No focal deficit present.      Mental Status: He is alert and oriented to person, place, and time.      Gait: Gait is intact.   Psychiatric:         Attention and Perception: Attention and perception normal.         Mood and Affect: Mood normal.         Speech: Speech normal.         Behavior: Behavior normal. Behavior is cooperative.         Thought Content: Thought content normal.         Judgment: Judgment normal.         Assessment/Plan:   Assessment    1. Close exposure to COVID-19 virus  - SARS-CoV-2 PCR (24 hour In-House): Collect NP swab in VTM; Future    2. Other fatigue  - SARS-CoV-2 PCR (24 hour In-House): Collect NP swab in VTM; Future    3. Fever and chills  - SARS-CoV-2 PCR (24 hour In-House): Collect NP swab in VTM; Future    4. Diarrhea in adult patient  - SARS-CoV-2 PCR (24 hour In-House): Collect NP swab in VTM; Future    5. Myalgia  - SARS-CoV-2 PCR (24 hour In-House): Collect NP swab in VTM; Future    6. Cough  - SARS-CoV-2 PCR (24 hour In-House): Collect NP swab in VTM; Future    Vital signs stable at today's acute urgent care visit.  Reviewed test results completed in clinic.  Will obtain PCR COVID testing.  Advised to home isolate per CDC guidelines.  Supportive care options also discussed, to include alternating Tylenol and Advil, cough/cold/flu OTC medications for symptomatic relief, in addition to rest, fluids as tolerated. Differential diagnosis, natural history, and indications for immediate follow-up were  discussed.     Advised the patient to follow-up with the primary care provider for recheck, reevaluation, and/or consideration of further management if necessary. Return to urgent care with any worsening symptoms or if there is no improvement in their current condition. Red flags discussed and indications to immediately call 911 or present to the Emergency Department.  All questions were encouraged and answered to the patient's satisfaction and understanding, and they agree to the plan of care.     I personally reviewed prior external notes and test results pertinent to today's visit.  I have independently reviewed and interpreted all diagnostics ordered during this urgent care acute visit. Time spent evaluating this patient was a greater than 30 minutes and includes preparing for visit, counseling/education, exam and evaluation, obtaining history, independent interpretation and ordering lab/test/procedures.      Please note that this dictation was created using voice recognition software. I have made a reasonable attempt to correct obvious errors, but I expect that there are errors of grammar and possibly content that I did not discover before finalizing the note.

## 2022-01-18 NOTE — LETTER
January 18, 2022        Rich Rizzo Kevin    Your employee/student was seen in our clinic today.  A concern for COVID-19 has been identified and testing is in progress. Please excuse his missed days of work on the dates of 1/16 until his test results return.    We are asking you to excuse absences while following self-isolation protocol per Center for Disease Control (CDC) guidelines.  Your employee/student (and/or their parent) will be able to access test results through our electronic delivery system called [employer].     If the results of testing are negative, and once there has been no fever (temperature >100.4 F) for at least 72 hours without treatment, and no vomiting or diarrhea for at least 48 hours, then return to work/school is approved.    If the results of testing are positive then your employee/student will be contacted by the [facility] or [facility] department for further instructions on duration of self-isolation and return to work protocol. In general, this will also follow the CDC guidelines     In general, repeat testing is not necessary and not offered through our [facility].     This is the only note that will be provided from [facility] for this visit.  Your employee/student will require an appointment with a primary care provider if FMLA or disability forms are required.    If you have any questions please do not hesitate to call me at the phone number listed below.    Sincerely,      Rena Guzman, APRN  [phone]  Electronically Signed

## 2022-01-19 DIAGNOSIS — Z20.822 CLOSE EXPOSURE TO COVID-19 VIRUS: ICD-10-CM

## 2022-01-19 DIAGNOSIS — R19.7 DIARRHEA IN ADULT PATIENT: ICD-10-CM

## 2022-01-19 DIAGNOSIS — R05.9 COUGH: ICD-10-CM

## 2022-01-19 DIAGNOSIS — R50.9 FEVER AND CHILLS: ICD-10-CM

## 2022-01-19 DIAGNOSIS — R53.83 OTHER FATIGUE: ICD-10-CM

## 2022-01-19 DIAGNOSIS — M79.10 MYALGIA: ICD-10-CM

## 2022-01-20 LAB
COVID ORDER STATUS COVID19: NORMAL
SARS-COV-2 RNA RESP QL NAA+PROBE: DETECTED
SPECIMEN SOURCE: ABNORMAL

## 2022-10-17 ENCOUNTER — APPOINTMENT (OUTPATIENT)
Dept: RADIOLOGY | Facility: MEDICAL CENTER | Age: 32
End: 2022-10-17
Attending: EMERGENCY MEDICINE
Payer: COMMERCIAL

## 2022-10-17 ENCOUNTER — HOSPITAL ENCOUNTER (EMERGENCY)
Facility: MEDICAL CENTER | Age: 32
End: 2022-10-17
Attending: EMERGENCY MEDICINE
Payer: COMMERCIAL

## 2022-10-17 VITALS
SYSTOLIC BLOOD PRESSURE: 118 MMHG | HEART RATE: 77 BPM | RESPIRATION RATE: 18 BRPM | DIASTOLIC BLOOD PRESSURE: 81 MMHG | TEMPERATURE: 98.5 F | WEIGHT: 241.4 LBS | OXYGEN SATURATION: 97 % | BODY MASS INDEX: 33.8 KG/M2 | HEIGHT: 71 IN

## 2022-10-17 DIAGNOSIS — S89.91XA INJURY OF RIGHT KNEE, INITIAL ENCOUNTER: ICD-10-CM

## 2022-10-17 DIAGNOSIS — T14.8XXA ABRASION: ICD-10-CM

## 2022-10-17 PROCEDURE — A9270 NON-COVERED ITEM OR SERVICE: HCPCS | Performed by: EMERGENCY MEDICINE

## 2022-10-17 PROCEDURE — 99284 EMERGENCY DEPT VISIT MOD MDM: CPT

## 2022-10-17 PROCEDURE — 73564 X-RAY EXAM KNEE 4 OR MORE: CPT | Mod: RT

## 2022-10-17 PROCEDURE — 700102 HCHG RX REV CODE 250 W/ 637 OVERRIDE(OP): Performed by: EMERGENCY MEDICINE

## 2022-10-17 RX ORDER — IBUPROFEN 600 MG/1
600 TABLET ORAL ONCE
Status: COMPLETED | OUTPATIENT
Start: 2022-10-17 | End: 2022-10-17

## 2022-10-17 RX ADMIN — IBUPROFEN 600 MG: 600 TABLET, FILM COATED ORAL at 09:12
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
## 2022-10-17 NOTE — ED NOTES
Patient is stable for discharge at this time, anticipatory guidance provided, close follow-up is encouraged, and ED return instructions have been detailed. Patient is both agreeable to the disposition and plan and discharged home in ambulatory state and in good condition.     Work note provided- approved by ERP;

## 2022-10-17 NOTE — DISCHARGE INSTRUCTIONS
Rest, take ibuprofen for pain.  Return for pain, swelling, or other concerns.  Watch for signs of infection.  Apply antibiotic ointment twice a day.  Follow-up with orthopedics if not improved.
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
## 2023-01-22 ENCOUNTER — OFFICE VISIT (OUTPATIENT)
Dept: URGENT CARE | Facility: CLINIC | Age: 33
End: 2023-01-22
Payer: COMMERCIAL

## 2023-01-22 VITALS
WEIGHT: 241 LBS | BODY MASS INDEX: 33.74 KG/M2 | OXYGEN SATURATION: 96 % | SYSTOLIC BLOOD PRESSURE: 132 MMHG | HEART RATE: 96 BPM | RESPIRATION RATE: 20 BRPM | HEIGHT: 71 IN | DIASTOLIC BLOOD PRESSURE: 82 MMHG | TEMPERATURE: 98.7 F

## 2023-01-22 DIAGNOSIS — S86.912A STRAIN OF LEFT KNEE, INITIAL ENCOUNTER: ICD-10-CM

## 2023-01-22 PROCEDURE — 99213 OFFICE O/P EST LOW 20 MIN: CPT

## 2023-01-22 ASSESSMENT — PAIN SCALES - GENERAL: PAINLEVEL: 5=MODERATE PAIN

## 2023-01-22 NOTE — PROGRESS NOTES
"Subjective:   Rich Brown is a 32 y.o. male who presents for Knee Pain (X 1 week,fell on ice, Lt knee, heard a \"pop\" shooting pain down to his outer side, had Lt knee surgery as a child, painful depending on his movements/)      HPI: This is a 32-year-old male who presents today for evaluation of left knee pain.  This is a new problem.  Patient reports developing left knee pain 5 days ago, after helping to push a car out of a driveway.  Patient reports slipping on ice and experiencing jerking movements to the left knee.  Patient reports hearing a \" pop\".  He has been ambulatory since the incident.  He reports pain is 1-2\10.  He reports resting over the last few days.  Patient reports intermittent shooting pain to the lateral aspect of his left knee.  He reports surgery for ACL tear in 2010.  No numbness or tingling.    ROS per HPI    Medications:    No current outpatient medications on file prior to visit.     No current facility-administered medications on file prior to visit.        Allergies:   Patient has no known allergies.    Problem List:   Patient Active Problem List   Diagnosis    History of reconstruction of anterior cruciate ligament tear    Tobacco user        Surgical History:  Past Surgical History:   Procedure Laterality Date    REPAIR, KNEE, ACL Left 2010    left knee    ARCH BAR REMOVAL OR REPAIR  10/28/2008    [facility]    ARCH BAR APPLICATION  09/24/2008    Performed by KAREY MILES at SURGERY [facility] ORS    CLOSED REDUCTION  09/24/2008    Performed by KAREY MILES at SURGERY [facility] ORS       Past Social Hx:   Social History     Tobacco Use    Smoking status: Some Days     Packs/day: 0.50     Years: 6.00     Pack years: 3.00     Types: Cigarettes    Smokeless tobacco: Never   Vaping Use    Vaping Use: Never used   Substance Use Topics    Alcohol use: Not Currently     Comment: rarely     Drug use: No    " "      Problem list, medications, and allergies reviewed by myself today in Epic.     Objective:     /82 (BP Location: Left arm, Patient Position: Sitting, BP Cuff Size: Adult)   Pulse 96   Temp 37.1 °C (98.7 °F) (Temporal)   Resp 20   Ht 1.803 m (5' 11\")   Wt 109 kg (241 lb)   SpO2 96%   BMI 33.61 kg/m²     Physical Exam  Vitals and nursing note reviewed.   Constitutional:       General: He is not in acute distress.     Appearance: Normal appearance. He is normal weight. He is not ill-appearing, toxic-appearing or diaphoretic.   HENT:      Head: Normocephalic and atraumatic.   Cardiovascular:      Rate and Rhythm: Normal rate and regular rhythm.      Pulses: Normal pulses.      Heart sounds: Normal heart sounds. No murmur heard.    No friction rub. No gallop.   Pulmonary:      Effort: Pulmonary effort is normal. No respiratory distress.      Breath sounds: Normal breath sounds. No stridor. No wheezing, rhonchi or rales.   Chest:      Chest wall: No tenderness.   Musculoskeletal:      Left knee: No swelling, deformity, effusion, erythema, ecchymosis or crepitus. Normal range of motion. Tenderness present over the lateral joint line.      Instability Tests: Anterior drawer test negative. Posterior drawer test negative.        Legs:       Comments: 5/5 strength to left lower extremity    Skin:     General: Skin is warm and dry.      Capillary Refill: Capillary refill takes less than 2 seconds.   Neurological:      General: No focal deficit present.      Mental Status: He is alert and oriented to person, place, and time. Mental status is at baseline.      Gait: Gait normal.   Psychiatric:         Mood and Affect: Mood normal.         Behavior: Behavior normal.         Thought Content: Thought content normal.         Judgment: Judgment normal.       Assessment/Plan:     Diagnosis and associated orders:   1. Strain of left knee, initial encounter  DX-KNEE COMPLETE 4+ LEFT    Referral to Sports Medicine        "   Comments/MDM:   Pt is clinically stable at today's acute urgent care visit.  No acute distress noted. Appropriate for outpatient management at this time.     Acute problem.  Orders placed for DX of left knee.  I have recommended resting, elevating, icing, alternating Tylenol and ibuprofen. Referral placed to sports medicine for further evaluation and management.  Patient is to follow-up with sports medicine.  Patient agreeable plan of care verbalizes good understanding today.         Discussed DDx, management options (risks,benefits, and alternatives to planned treatment), natural progression and supportive care.  Expressed understanding and the treatment plan was agreed upon. Questions were encouraged and answered   Return to urgent care prn if new or worsening sx or if there is no improvement in condition prn.    Educated in Red flags and indications to immediately call 911 or present to the Emergency Department.   Advised the patient to follow-up with the primary care physician for recheck, reevaluation, and consideration of further management.    I personally reviewed prior external notes and test results pertinent to today's visit.  I have independently reviewed and interpreted all diagnostics ordered during this urgent care acute visit.     Please note that this dictation was created using voice recognition software. I have made a reasonable attempt to correct obvious errors, but I expect that there are errors of grammar and possibly content that I did not discover before finalizing the note.    This note was electronically signed by ILEANA Amaya

## 2023-01-25 ENCOUNTER — APPOINTMENT (OUTPATIENT)
Dept: RADIOLOGY | Facility: IMAGING CENTER | Age: 33
End: 2023-01-25
Payer: COMMERCIAL

## 2023-01-25 ENCOUNTER — OFFICE VISIT (OUTPATIENT)
Dept: SPORTS MEDICINE | Facility: CLINIC | Age: 33
End: 2023-01-25
Payer: COMMERCIAL

## 2023-01-25 VITALS
DIASTOLIC BLOOD PRESSURE: 90 MMHG | HEART RATE: 114 BPM | SYSTOLIC BLOOD PRESSURE: 138 MMHG | RESPIRATION RATE: 18 BRPM | OXYGEN SATURATION: 96 % | TEMPERATURE: 98.9 F | WEIGHT: 241 LBS | HEIGHT: 71 IN | BODY MASS INDEX: 33.74 KG/M2

## 2023-01-25 DIAGNOSIS — M25.562 ACUTE PAIN OF LEFT KNEE: ICD-10-CM

## 2023-01-25 DIAGNOSIS — M25.362 KNEE INSTABILITY, LEFT: ICD-10-CM

## 2023-01-25 DIAGNOSIS — S86.912A STRAIN OF LEFT KNEE, INITIAL ENCOUNTER: ICD-10-CM

## 2023-01-25 DIAGNOSIS — M23.42 LOOSE BODY OF LEFT KNEE: ICD-10-CM

## 2023-01-25 PROCEDURE — 73564 X-RAY EXAM KNEE 4 OR MORE: CPT | Mod: TC,LT | Performed by: RADIOLOGY

## 2023-01-25 PROCEDURE — 99213 OFFICE O/P EST LOW 20 MIN: CPT | Performed by: FAMILY MEDICINE

## 2023-01-25 NOTE — LETTER
January 25, 2023    To Whom It May Concern:         This is confirmation that Rich Brown attended his scheduled appointment with Onur Corcoran M.D. on 1/25/23.  He is recovering from an acute knee injury.  He will require accommodations until he recovers.  He is anticipated to require accommodations for the next 3 weeks.  He may not stand/walk for more than 3 hours/day.  He may not do any lifting over 25 pounds.  Prefer that he do seated work to avoid reinjury of his left knee.  Thank you for making accommodations as he recovers.         If you have any questions please do not hesitate to call me at the phone number listed below.    Sincerely,          Onur Corcoran M.D.  [phone]

## 2023-01-25 NOTE — PROGRESS NOTES
"Subjective:     Rich Brown is a 32 y.o. male who presents for Knee Pain (Referral from UC/ L knee pain )    HPI  Pt presents for evaluation of left knee pain  Patient had a slip on ice while pushing a car out of a driveway approximately 1 week ago  States that he heard a pop at the time  Slipped and hurt knee, but did not fall down and knee did not hit the ground   Pain is intermittent   When pain hits, it is severe   Pain is lateral knee   Knee feels unstable and has given out on him several times  Knee is swollen  No overlying bruising or erythema    History of prior ACL reconstruction with bilateral partial meniscectomy at the age of 19    Review of Systems   Skin:  Negative for rash.     PMH:  has no past medical history on file.  MEDS: No current outpatient medications on file.  ALLERGIES: No Known Allergies  SURGHX:   Past Surgical History:   Procedure Laterality Date    REPAIR, KNEE, ACL Left 2010    left knee    ARCH BAR REMOVAL OR REPAIR  10/28/2008    Performed by ORLY MON at [facility]    ARCH BAR APPLICATION  09/24/2008    Performed by KAREY MILES at SURGERY [facility] ORS    CLOSED REDUCTION  09/24/2008    Performed by KAREY MILES at SURGERY [facility] ORS     SOCHX:  reports that he has been smoking cigarettes. He has a 3.00 pack-year smoking history. He has never used smokeless tobacco. He reports that he does not currently use alcohol. He reports that he does not use drugs.     Objective:   BP (!) 138/90 (BP Location: Left arm, Patient Position: Sitting, BP Cuff Size: Large adult)   Pulse (!) 114   Temp 37.2 °C (98.9 °F) (Temporal)   Resp 18   Ht 1.803 m (5' 11\")   Wt 109 kg (241 lb)   SpO2 96%   BMI 33.61 kg/m²     Physical Exam  Constitutional:       General: He is not in acute distress.     Appearance: He is well-developed. He is not diaphoretic.   Pulmonary:      Effort: Pulmonary effort is normal.   Neurological:      Mental Status: He " is alert.   Left knee  Appearance - No bruising, erythema, or deformity appreciated  Palpation - +TTP along lateral joint line, lateral quad, medial quad  ROM - ~5 degrees short of full extension, full flexion  Neuro - Sensation equal and intact bilaterally  Special testing - No laxity with varus/valgus stress, neg Lachman's, Toña's causes severe pain in the lateral knee, neg patellar apprehension test    Assessment/Plan:   Assessment    1. Knee instability, left  - MR-KNEE-W/O LEFT; Future    2. Loose body of left knee  - MR-KNEE-W/O LEFT; Future    3. Acute pain of left knee  - MR-KNEE-W/O LEFT; Future    Patient with a new left injury resulting in left knee instability.  Has point tenderness along the left joint line and severe pain during Toña's testing.  There is concern for an acute lateral meniscus tear.  Because of patient's age, this could be a repairable tear depending on morphology.  Recommended MRI to further evaluate if he could be a surgical candidate.  Given note for work with restrictions.  He was given a home exercise program to start working on and will follow-up after MRI results complete.

## 2023-02-22 ENCOUNTER — OFFICE VISIT (OUTPATIENT)
Dept: SPORTS MEDICINE | Facility: CLINIC | Age: 33
End: 2023-02-22
Payer: COMMERCIAL

## 2023-02-22 VITALS
HEART RATE: 81 BPM | RESPIRATION RATE: 16 BRPM | HEIGHT: 71 IN | DIASTOLIC BLOOD PRESSURE: 76 MMHG | SYSTOLIC BLOOD PRESSURE: 120 MMHG | OXYGEN SATURATION: 98 % | WEIGHT: 241 LBS | BODY MASS INDEX: 33.74 KG/M2 | TEMPERATURE: 98.2 F

## 2023-02-22 DIAGNOSIS — M25.362 KNEE INSTABILITY, LEFT: ICD-10-CM

## 2023-02-22 DIAGNOSIS — M25.562 ACUTE PAIN OF LEFT KNEE: ICD-10-CM

## 2023-02-22 PROCEDURE — 99213 OFFICE O/P EST LOW 20 MIN: CPT | Performed by: FAMILY MEDICINE

## 2023-02-22 ASSESSMENT — ENCOUNTER SYMPTOMS: FEVER: 0

## 2023-02-22 NOTE — LETTER
February 22, 2023    To Whom It May Concern:         This is confirmation that Rich Brown attended his scheduled appointment with Onur Corcoran M.D. on 2/22/23.  Patient recovering slowly from left knee injury.  He will need to continue light duty status at this time.  He will be wearing a knee brace at work to improve his mobility, but still recommended not to be up on his feet for more than 3 hours per shift in total.  No lifting over 25 pounds.  No activities which require squatting or kneeling.  These restrictions will last 4 more weeks.  Thank you for being patient as he recovers.         If you have any questions please do not hesitate to call me at the phone number listed below.    Sincerely,          Onur Corcoran M.D.  [phone]

## 2023-02-22 NOTE — LETTER
February 22, 2023      Mild activity such as going for walks and range of motion exercises are good    Ice knee as needed when pain increases     NSAIDs (ibuprofen 600-800mg every 8 hours) as needed     Knee brace when up and active, do not need to wear when resting at home or sleeping     Topical diclofenac cream or capsaicin ointment will both make the knee feel better    Injection into the knee with steroids could help greatly            Onur Corcoran M.D.  [phone]

## 2023-02-22 NOTE — PROGRESS NOTES
"Subjective:     Rich Brown is a 32 y.o. male who presents for Knee Pain (F/V MRI results )      HPI  Pt presents for follow-up MRI results and to have knee pain evaluated again   MRI of knee on 2/19/23   1. Anterior cruciate ligament graft is intact.  2. Probable postsurgical changes to the medial meniscus without evidence of recurrent tear. There is no internal derangement.  3. Medial and patellofemoral compartment degenerative changes, as described above with small joint effusion.    Since last visit, patient has been working on some range of motion exercises and feels that the pain is actually worsening some  Pain is more in the anterior knee  Knee has not been giving out on him  Works nights at [employer]   Has been on \"light duty\".  Standing and moving around a 50 foot area for ~20 minutes, then sitting on computer for a few hours   Has to walk up 3 flights of stairs sometimes   The pain still bothers him greatly at work  Has not been wearing a knee brace  Has not been using any topical creams  Has not attended physical therapy for this    Review of Systems   Constitutional:  Negative for fever.     PMH:  has no past medical history on file.  MEDS: No current outpatient medications on file.  ALLERGIES: No Known Allergies  SURGHX:   Past Surgical History:   Procedure Laterality Date    REPAIR, KNEE, ACL Left 2010    left knee    ARCH BAR REMOVAL OR REPAIR  10/28/2008    Performed by ORLY MON at [facility]    ARCH BAR APPLICATION  09/24/2008    Performed by KAREY MILES at SURGERY [facility]S    CLOSED REDUCTION  09/24/2008    Performed by KAREY MILES at SURGERY [facility] ORS     SOCHX:  reports that he has been smoking cigarettes. He has a 3.00 pack-year smoking history. He has never used smokeless tobacco. He reports that he does not currently use alcohol. He reports that he does not use drugs.     Objective:   /76 (BP Location: Left arm, Patient " "Position: Sitting, BP Cuff Size: Large adult)   Pulse 81   Temp 36.8 °C (98.2 °F) (Temporal)   Resp 16   Ht 1.803 m (5' 11\")   Wt 109 kg (241 lb)   SpO2 98%   BMI 33.61 kg/m²     Physical Exam  Constitutional:       General: He is not in acute distress.     Appearance: He is well-developed. He is not diaphoretic.   Pulmonary:      Effort: Pulmonary effort is normal.   Neurological:      Mental Status: He is alert.   Left knee  Appearance - No bruising, erythema, or deformity appreciated  Palpation - +TTP in the peripatellar area, pes anserine, and throughout medial and lateral quad  ROM - FROM extension with flexion limitation due to pain  Strength - 5/5 throughout  Neuro - Sensation equal and intact bilaterally  Special testing - +patellar apprehension test    Assessment/Plan:   Assessment    1. Acute pain of left knee  - Referral to Physical Therapy    2. Knee instability, left  - Referral to Physical Therapy    Patient with continued left knee pain and sensation of instability.  MRI does not show repeat tear of ACL or meniscus.  Unfortunately, he is not doing well with home exercise program and did recommend getting him into physical therapy.  Patellar stabilization brace was placed on his knee and symptoms had some immediate improvement.  Recommended wearing brace when he is up and active, however he does not need to sleep in brace.  Reviewed other supportive care measures and will plan to follow-up after he has participated in physical therapy for a few sessions to monitor progress.    "

## 2023-03-27 ENCOUNTER — PATIENT MESSAGE (OUTPATIENT)
Dept: SPORTS MEDICINE | Facility: CLINIC | Age: 33
End: 2023-03-27
Payer: COMMERCIAL

## 2023-04-05 ENCOUNTER — OFFICE VISIT (OUTPATIENT)
Dept: SPORTS MEDICINE | Facility: CLINIC | Age: 33
End: 2023-04-05
Payer: COMMERCIAL

## 2023-04-05 VITALS
SYSTOLIC BLOOD PRESSURE: 122 MMHG | BODY MASS INDEX: 33.74 KG/M2 | TEMPERATURE: 98.2 F | HEIGHT: 71 IN | RESPIRATION RATE: 16 BRPM | HEART RATE: 71 BPM | OXYGEN SATURATION: 97 % | WEIGHT: 241 LBS | DIASTOLIC BLOOD PRESSURE: 78 MMHG

## 2023-04-05 DIAGNOSIS — M25.562 ACUTE PAIN OF LEFT KNEE: ICD-10-CM

## 2023-04-05 PROCEDURE — 20610 DRAIN/INJ JOINT/BURSA W/O US: CPT | Mod: LT | Performed by: FAMILY MEDICINE

## 2023-04-05 RX ORDER — TRIAMCINOLONE ACETONIDE 40 MG/ML
40 INJECTION, SUSPENSION INTRA-ARTICULAR; INTRAMUSCULAR ONCE
Status: COMPLETED | OUTPATIENT
Start: 2023-04-05 | End: 2023-04-05

## 2023-04-05 RX ADMIN — TRIAMCINOLONE ACETONIDE 40 MG: 40 INJECTION, SUSPENSION INTRA-ARTICULAR; INTRAMUSCULAR at 09:27

## 2023-04-05 ASSESSMENT — ENCOUNTER SYMPTOMS: FEVER: 0

## 2023-04-05 NOTE — LETTER
April 5, 2023    To Whom It May Concern:         This is confirmation that Rich Brown attended his scheduled appointment with Onur Corcoran M.D. on 4/05/23.  Thank you for making accommodations as he recovers from his knee injury.  He is able to advance his work restrictions at this time.  May lift up to 50 pounds, may work full 12-hour shifts as long as he is not up on his feet for more than 6 hours total standing/walking.  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  These restrictions will last until 5/10/2023.         If you have any questions please do not hesitate to call me at the phone number listed below.    Sincerely,          Onur Corcoran M.D.  [phone]

## 2023-04-05 NOTE — LETTER
April 5, 2023        Your referral went to the following physical therapy office:  [facility] [address] [place] [address].  [facility] [address]  [facility] [address]  Phone: [phone]      To schedule physical therapy, you can call the following number:  [phone]        Onur Corcoran M.D.  [phone]

## 2023-04-05 NOTE — PROGRESS NOTES
"Subjective:     Rich Brown is a 32 y.o. male who presents for Knee Pain (F/V L knee pain, injection )    HPI  Pt presents for knee injection  Has been seen for knee pain and was given a home exercise program which made only minimal improvements  Has been on work restrictions and doing well with work  Was given referral to physical therapy and has been unable to schedule quite yet  No redness or swelling of the knee  No recent falls or injuries since last visit    Review of Systems   Constitutional:  Negative for fever.   Skin:  Negative for rash.     PMH:  has no past medical history on file.  MEDS: No current outpatient medications on file.  ALLERGIES: No Known Allergies  SURGHX:   Past Surgical History:   Procedure Laterality Date    REPAIR, KNEE, ACL Left 2010    left knee    ARCH BAR REMOVAL OR REPAIR  10/28/2008    [facility]    ARCH BAR APPLICATION  09/24/2008    Performed by KAREY MILES at SURGERY [facility]    CLOSED REDUCTION  09/24/2008    Performed by KAREY MILES at SURGERY [facility] ORS     SOCHX:  reports that he has been smoking cigarettes. He has a 3.00 pack-year smoking history. He has never used smokeless tobacco. He reports that he does not currently use alcohol. He reports that he does not use drugs.     Objective:   /78 (BP Location: Left arm, Patient Position: Sitting, BP Cuff Size: Large adult)   Pulse 71   Temp 36.8 °C (98.2 °F) (Temporal)   Resp 16   Ht 1.803 m (5' 11\")   Wt 109 kg (241 lb)   SpO2 97%   BMI 33.61 kg/m²     Physical Exam  Left knee  Appearance - No bruising, erythema, or deformity appreciated  Palpation - +TTP along joint lines   ROM - FROM without crepitus    Knee injection   First, a verbal consent and a verbal time-out were done, explaining the risks and benefits of the procedure. Then the patient was placed in a seated position.  Anterior lateral joint line portals were " identified. The skin was cleaned with alcohol and the clean, no touch technique was used with a 25-gauge 1.5-inch needle. A combination of 5 mL of 1% lidocaine without epinephrine and 1 milliliter of Kenalog 40 mg/mL was injected into the left knee. The patient tolerated the procedure well and there were no immediate post injection complications. Hemostasis was then achieved with gentle pressure and a Band-Aid was placed over the lesion. Post injection instructions for range of motion, ice, activity modification, signs of infection, emergency precautions were discussed with the patient.      Assessment/Plan:   Assessment    1. Acute pain of left knee  - triamcinolone acetonide (KENALOG-40) injection 40 mg    Patient with acute pain of the left knee.  MRI did not show any large meniscus tear or tendon rupture.  Reviewed treatment options and patient preferred to have injection with corticosteroids.  As above, injection tolerated well with no acute complication.  Will start physical therapy as soon as he is able and follow-up in this office in about 6 weeks.

## 2023-05-04 ENCOUNTER — PHYSICAL THERAPY (OUTPATIENT)
Dept: PHYSICAL THERAPY | Facility: MEDICAL CENTER | Age: 33
End: 2023-05-04
Attending: FAMILY MEDICINE
Payer: COMMERCIAL

## 2023-05-04 DIAGNOSIS — M25.362 KNEE INSTABILITY, LEFT: ICD-10-CM

## 2023-05-04 DIAGNOSIS — M25.562 ACUTE PAIN OF LEFT KNEE: ICD-10-CM

## 2023-05-04 PROCEDURE — 97110 THERAPEUTIC EXERCISES: CPT

## 2023-05-04 PROCEDURE — 97162 PT EVAL MOD COMPLEX 30 MIN: CPT

## 2023-05-04 ASSESSMENT — ENCOUNTER SYMPTOMS: PAIN SCALE: 0

## 2023-05-04 NOTE — OP THERAPY EVALUATION
"  Outpatient Physical Therapy  INITIAL EVALUATION    [facility] Outpatient Physical Therapy  [address]  [place] [address]  Phone:  [phone]  Fax:  [phone]    Date of Evaluation: 05/04/2023    Patient: Rich Brown  YOB: 1990  MRN: [id number]     Referring Provider: Onur Corcoran M.D.  [address]  [address]  [place] [address]   Referring Diagnosis Acute pain of left knee [M25.562];Knee instability, left [M25.362]     Time Calculation  Start time: 1423  Stop time: 1505 Time Calculation (min): 42 minutes         Chief Complaint: Knee Problem    Visit Diagnoses     ICD-10-CM   1. Acute pain of left knee  M25.562   2. Knee instability, left  M25.362       Date of onset of impairment: No data found    Subjective:   History of Present Illness:     Mechanism of injury:  Pt late to check in (standard 10-15 min prior to appt); resulting in delayed start to session to allow for check-in procedures.     Patient is a 32 year old male with a PMH including: History of prior ACL reconstruction with bilateral partial meniscectomy at [facility] at the age of 19.     Pt presents to therapy with complaints of chronic left knee pain and sensation of instability. Injured L knee in 2010 and a month later had L ACL reconstruction and B partial menisectomy. Prev working at [employer] in 2014 and had soreness. Started working at [employer] and feeling cont'd knee soreness \"but not pain.\"  Later slipping on ice while trying to push a car out of the driveway in Jan 2023, felt a pop in Jan, which resulted in pain at knee with prolonged standing. Provided with knee injection at sports med on 4/5/23, about 5% improved overall. Is currently on light duty; will follow up with Dr. Corcoran after trial PT. Has residual numbness at L anterior knee following sx, no new numbness. Notices some instability on L knee but denies falls, favors R knee in standing per report. Notices " intermittent swelling at medial portion at L knee after prolonged activity and work.    Currently denies changes in bowel and bladder, saddle anesthesia, significant weight changes, chills/night sweats, nausea and vomiting, and unexplained fatigue. Denies hx of cancer.         Pain:     Current pain ratin    Progression:  Worsening    Pain Comments::  Aggravating: 3-4 hours prolonged standing, stairs ascending/descending, squatting and rep bending of the knee     Relieving: Sitting, numbness x 1-2 hours of relief,   Social Support:     Lives in:  Apartment (stairs; on ground floor)  Diagnostic Tests:     Diagnostic Tests Comments:  MRI of knee on 23   1. Anterior cruciate ligament graft is intact.  2. Probable postsurgical changes to the medial meniscus without evidence of recurrent tear. There is no internal derangement.  3. Medial and patellofemoral compartment degenerative changes, as described above with small joint effusion.    23:  FINDINGS:  Bone density is normal. There is no evidence of fracture or dislocation. There are surgical changes consistent with anterior cruciate ligament graft repair. There is mild tricompartment osteoarthritis. There is an intra-articular ossific loose body.   There is a small joint effusion.     IMPRESSION:     1.  Tricompartment osteoarthritis.     2.  Small intra-articular ossific loose body within the joint space anteriorly.     3.  Prior anterior cruciate ligament graft repair.    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  Past Surgical History:   Procedure Laterality Date   • REPAIR, KNEE, ACL Left 2010    left knee   • ARCH BAR REMOVAL OR REPAIR  10/28/2008    Performed by ORLY MON at SURGERY SAME DAY [facility] ORS   • ARCH BAR APPLICATION  09/24/2008    Performed by KAREY MILES at SURGERY [facility] ORS   • CLOSED REDUCTION  09/24/2008    Performed by KAREY MILES at SURGERY [facility] ORS     Social History     Tobacco Use   • Smoking status: Some Days     Packs/day: 0.50     Years: 6.00     Pack years: 3.00     Types: Cigarettes   • Smokeless tobacco: Never   Substance Use Topics   • Alcohol use: Not Currently     Comment: rarely      Family and Occupational History     Socioeconomic History   • Marital status: Single     Spouse name: Not on file   • Number of children: Not on file   • Years of education: Not on file   • Highest education level: Not on file   Occupational History   • Not on file       Objective     Postural Observations  Seated posture: poor    Additional Postural Observation Details  Forward head and rounded shoulders    Hip Screen   Hip range of motion within functional limits with the following exceptions: Hip IR/ER WFL LLE, hip ER WFL and hip IR restricted slightly on R  HS length very restricted B   *assessed supine 90/90    Hip flexor and quad L tightness -tested in R SL    Palpation     Additional Palpation Details  TTP at B joint line  L knee   No TTP posterior HS insertion at L knee  No TTP at distal quad L knee      Active Range of Motion     Lumbar   Flexion: decreased (FT to distal shins)  Extension: decreased (min decrease)  Left lateral flexion: within functional limits  Right lateral flexion: within functional limits  Left rotation: within functional limits  Right rotation: within functional  limits  Left Knee   Flexion: 0 degrees   Extension: 135 degrees     Additional Active Range of Motion Details  No pain with knee flexion or extension in lying    Strength:      Left Knee   Quadriceps contraction: good    Right Knee   Quadriceps contraction: good    Additional Strength Details  Double LE bridge: Full ROM   SL bridge visible muscle juddering LLE  SL extension test: 4-/5 R, 3-/5 LLE    Hip abd strength L: 3+/5     Tests     Left Hip   SLR: Negative.     Right Hip   SLR: Negative.     General Comments     Knee Comments  Quad dominant squat with audible crepitus       Therapeutic Exercises (CPT 87947):     1. new hep as below    2. pt education, re: PT exam findings    3. pt education, re: review of imaging      Therapeutic Exercise Summary: Access Code: [email or web address]  URL: [email or web address]  Date: 05/04/2023  Prepared by: Emilio Sims    Exercises  - Bridge with Resistance  - 2 x daily - 7 x weekly - 1-2 sets - 10 reps - 3 sec  hold  - Hip Flexor Stretch at Edge of Bed  - 2 x daily - 7 x weekly - 2-3 sets - 30 sec hold    Pt performed these exercises with instruction and SPV.  Provided handout with these exercises for daily HEP.        Time-based treatments/modalities:    Physical Therapy Timed Treatment Charges  Therapeutic exercise minutes (CPT 45584): 12 minutes      Assessment, Response and Plan:   Impairments: activity intolerance, difficulty performing job, impaired physical strength and lacks appropriate home exercise program    Assessment details:  Patient is a pleasant and cooperative 32 year old who presents to therapy with c/o chronic L knee pain and sensation of instability. He has PMH for L ACL reconstruction with bilateral partial meniscectomy at the age of 19. Has recently injured this year with fall into pool in Oct 2022 and slipping on ice without GLF in Jan 2023. Steroid injection provided 4/5/23 due to continued s/s. He is currently working nights light duty at [employer];  mostly sitting. No red flags reported. Imaging remarkable for: 1. Anterior cruciate ligament graft is intact, 2. Probable postsurgical changes to the medial meniscus without evidence of recurrent tear. There is no internal derangement, and 3. Medial and patellofemoral compartment degenerative changes, as described above with small joint effusion. Exam findings suggestive of core and proximal pelvic girdle weakness. L hip and Knee ROM WFL with exception of hip flexor and quad tightness. Pt may benefit from skilled physical therapy in order to address above impairments in order to improve QOL and return to reported ADL's.     Other barriers to therapy:  Prev injuires/sx; chronicity of L knee soreness  Prognosis: good    Goals:   Short Term Goals:   1. Pt will be independent with written HEP.      Short term goal time span:  2-4 weeks      Long Term Goals:    1. Pt will be independent with written HEP.  2. Pt will have a sig improvement in WOMAC score >/= MDC  (eval:34.38)  3. Pt will be able to tolerate a full shift at [employer]  and stand without incr baseline s/s in order to improve work efficiency.  4. Pt will be able to navigate one flight of stairs without HR and no greater than 2/10 discomfort in order to amb at work and at apts at home.  5. Pt will be able to return to strengthening routine at gym with min to no modifications in order to improve fitness and QOL.    Long term goal time span:  6-8 weeks    Plan:   Therapy options:  Physical therapy treatment to continue  Planned therapy interventions:  Neuromuscular Re-education (CPT 34699), Gait Training (CPT 50479) and Manual Therapy (CPT 18991)  Frequency: 1-2x/wk.  Duration in weeks:  8  Discussed with:  Patient  Plan details:  UPOC: 6/30/23        Functional Assessment Used  WOMAC Grand Total: 34.38     Referring provider co-signature:  I have reviewed this plan of care and my co-signature certifies the need for services.    Certification Period: 05/04/2023 to   6/30/23    Physician Signature: ________________________________ Date: ______________

## 2023-05-31 ENCOUNTER — PHYSICAL THERAPY (OUTPATIENT)
Dept: PHYSICAL THERAPY | Facility: MEDICAL CENTER | Age: 33
End: 2023-05-31
Attending: FAMILY MEDICINE
Payer: COMMERCIAL

## 2023-05-31 DIAGNOSIS — M25.362 KNEE INSTABILITY, LEFT: ICD-10-CM

## 2023-05-31 DIAGNOSIS — M25.562 ACUTE PAIN OF LEFT KNEE: ICD-10-CM

## 2023-05-31 PROCEDURE — 97110 THERAPEUTIC EXERCISES: CPT

## 2023-05-31 PROCEDURE — 97014 ELECTRIC STIMULATION THERAPY: CPT

## 2023-05-31 NOTE — OP THERAPY DAILY TREATMENT
Outpatient Physical Therapy  DAILY TREATMENT     [facility]  [address]  [place] [address]  Phone:  [phone]  Fax:  [phone]    Date: 05/31/2023    Patient: Rich Brown  YOB: 1990  MRN: [id number]     Time Calculation    Start time: 0730  Stop time: 0823 Time Calculation (min): 53 minutes         Chief Complaint: Knee Problem    Visit #: 2    SUBJECTIVE:  Pt reporting came off of full duty; had 8-9/10 pain with standing at work.       OBJECTIVE:  Current objective measures:     From eval:  Knee Comments  Quad dominant squat with audible crepitus     Additional Strength Details  Double LE bridge: Full ROM   SL bridge visible muscle juddering LLE  SL extension test: 4-/5 R, 3-/5 LLE     Hip abd strength L: 3+/5     Left Knee   Flexion: 0 degrees   Extension: 85 degrees       Therapeutic Exercises (CPT 55063):     1. nustepper, x5 min, cardiovascular warm up    2. hip flexor stretch in true stretch, x30 sec ea, visual grimacing and wincing with difficulties on L, DC-ed today    3. hip/quad stretch in hooklying, x30 sec, reviewed    4. active HS stretching, x15, hep; tightness B HS    5. ball bridge, 3x10 sec,  deg knee flexion with knee supported on ball; hep    6. clamshelparrish, green TB, x15 ea, hep; fatigue with visible muscle juddering    7. HS ball rolls, x2 min      Therapeutic Exercise Summary: Access Code: [email or web address]  URL: [email or web address]  Date: 05/04/2023  Prepared by: Emilio Sims    Exercises  - Bridge with Resistance  - 2 x daily - 7 x weekly - 1-2 sets - 10 reps - 3 sec  hold  - Hip Flexor Stretch at Edge of Bed  - 2 x daily - 7 x weekly - 2-3 sets - 30 sec hold    Pt performed these exercises with instruction and SPV.  Provided handout with these exercises for daily HEP.        Therapeutic Treatments and Modalities:     1. E Stim Unattended (CPT 47359), IFC and ice pack to L knee x 15  min, pain management    Time-based treatments/modalities:    Physical Therapy Timed Treatment Charges  Therapeutic exercise minutes (CPT 71615): 38 minutes      Pain rating (1-10) before treatment:  7/10 L knee   Aggravating: 3-4 hours prolonged standing, stairs ascending/descending, squatting and rep bending of the knee       ASSESSMENT:   Response to treatment:   Gap in care since eval on 5/4/23 due to difficulties with scheduling and work schedule. Loss of knee ROM from last session with pt attributing to increased soreness from transitioning to full time at work. Pt presenting to session immediately following night shift with incr pain rating but no major swelling noted. Pt hopeful to follow up with Dr. Corcoran in near future; has no appt yet scheduled. Mod carryover of hep with pt continuing to demo weakness in multiple planes when strengthening hips. Unloaded position today due to incr discomfort; will continue in this position and transition to incr loaded/WB positions as tolerated in upcoming sessions.         PLAN/RECOMMENDATIONS:   Plan for treatment: continue with current treatment  Planned interventions for next visit: continue with current treatment.  Progress note due next session  Assess response to trial estim; complete additional pelvic girdle strengthening ex

## 2023-07-06 ENCOUNTER — APPOINTMENT (OUTPATIENT)
Dept: PHYSICAL THERAPY | Facility: MEDICAL CENTER | Age: 33
End: 2023-07-06
Payer: COMMERCIAL

## 2023-12-27 ENCOUNTER — HOSPITAL ENCOUNTER (OUTPATIENT)
Facility: MEDICAL CENTER | Age: 33
End: 2023-12-27
Payer: COMMERCIAL

## 2023-12-27 ENCOUNTER — OFFICE VISIT (OUTPATIENT)
Dept: URGENT CARE | Facility: PHYSICIAN GROUP | Age: 33
End: 2023-12-27
Payer: COMMERCIAL

## 2023-12-27 VITALS
SYSTOLIC BLOOD PRESSURE: 126 MMHG | HEIGHT: 71 IN | HEART RATE: 80 BPM | TEMPERATURE: 97.7 F | RESPIRATION RATE: 14 BRPM | WEIGHT: 219 LBS | DIASTOLIC BLOOD PRESSURE: 70 MMHG | OXYGEN SATURATION: 95 % | BODY MASS INDEX: 30.66 KG/M2

## 2023-12-27 DIAGNOSIS — R19.7 DIARRHEA, UNSPECIFIED TYPE: ICD-10-CM

## 2023-12-27 LAB
C DIFF DNA SPEC QL NAA+PROBE: NEGATIVE
C DIFF TOX GENS STL QL NAA+PROBE: NEGATIVE
G LAMBLIA+C PARVUM AG STL QL RAPID: NORMAL
SIGNIFICANT IND 70042: NORMAL
SITE SITE: NORMAL
SOURCE SOURCE: NORMAL

## 2023-12-27 PROCEDURE — 99214 OFFICE O/P EST MOD 30 MIN: CPT

## 2023-12-27 PROCEDURE — 3074F SYST BP LT 130 MM HG: CPT

## 2023-12-27 PROCEDURE — 87045 FECES CULTURE AEROBIC BACT: CPT

## 2023-12-27 PROCEDURE — 87493 C DIFF AMPLIFIED PROBE: CPT

## 2023-12-27 PROCEDURE — 87046 STOOL CULTR AEROBIC BACT EA: CPT

## 2023-12-27 PROCEDURE — 3078F DIAST BP <80 MM HG: CPT

## 2023-12-27 PROCEDURE — 87899 AGENT NOS ASSAY W/OPTIC: CPT

## 2023-12-27 PROCEDURE — 87329 GIARDIA AG IA: CPT

## 2023-12-27 PROCEDURE — 87328 CRYPTOSPORIDIUM AG IA: CPT

## 2023-12-27 ASSESSMENT — ENCOUNTER SYMPTOMS
DIARRHEA: 1
ABDOMINAL PAIN: 0
HEADACHES: 0
COUGH: 0
BLOOD IN STOOL: 0
VOMITING: 1
SHORTNESS OF BREATH: 0
FEVER: 0
MYALGIAS: 1
NAUSEA: 1
SORE THROAT: 0
CHILLS: 1

## 2023-12-27 NOTE — PROGRESS NOTES
Subjective:     CHIEF COMPLAINT  Chief Complaint   Patient presents with    Diarrhea     X 2 weeks, started getting better, came back x 3 days ,        HPI  Rich Brown is a very pleasant 33 y.o. male who presents with diarrhea that has been present for approximately 2 to 3 weeks.  He reports that his symptoms started exclusively with diarrhea, which seemed to improve at around the 2-week rosa.  He had an improvement of symptoms for 3 days and the diarrhea returned again.  He has been having 4-5 episodes of diarrhea per day.  He has not seen any blood in his stool.  He had vomiting at the onset of his symptoms, which has since resolved.  He reports his girlfriend initially had similar symptoms, but hers resolved entirely. He has not had any fevers, body aches, or chills.  He has not seen any black/tarry stool.  He denies any recent antibiotic use.  He denies any recent travel outside of the country.  He denies any recent abdominal surgeries.    REVIEW OF SYSTEMS  Review of Systems   Constitutional:  Positive for chills. Negative for fever and malaise/fatigue.   HENT:  Negative for congestion and sore throat.    Respiratory:  Negative for cough and shortness of breath.    Cardiovascular:  Negative for chest pain.   Gastrointestinal:  Positive for diarrhea, nausea (Resolved) and vomiting (Resolved). Negative for abdominal pain, blood in stool and melena.   Musculoskeletal:  Positive for myalgias.   Neurological:  Negative for headaches.       PAST MEDICAL HISTORY  Patient Active Problem List    Diagnosis Date Noted    History of reconstruction of anterior cruciate ligament tear 02/19/2021    Tobacco user 02/19/2021       SURGICAL HISTORY   has a past surgical history that includes arch bar application (09/24/2008); arch bar removal or repair (10/28/2008); closed reduction (09/24/2008); and repair, knee, acl (Left, 2010).    ALLERGIES  No Known Allergies    CURRENT MEDICATIONS  Home Medications       Reviewed  "by Sunshine Salas P.A.-C. (Physician Assistant) on 12/27/23 at 1314  Med List Status: <None>     Medication Last Dose Status        Patient Derek Taking any Medications                           SOCIAL HISTORY  Social History     Tobacco Use    Smoking status: Some Days     Current packs/day: 0.50     Average packs/day: 0.5 packs/day for 6.0 years (3.0 ttl pk-yrs)     Types: Cigarettes    Smokeless tobacco: Never   Vaping Use    Vaping Use: Never used   Substance and Sexual Activity    Alcohol use: Not Currently     Comment: rarely     Drug use: No    Sexual activity: Not on file       FAMILY HISTORY  History reviewed. No pertinent family history.       Objective:     VITAL SIGNS: /70 (BP Location: Right arm, Patient Position: Sitting, BP Cuff Size: Adult)   Pulse 80   Temp 36.5 °C (97.7 °F) (Temporal)   Resp 14   Ht 1.803 m (5' 11\")   Wt 99.3 kg (219 lb)   SpO2 95%   BMI 30.54 kg/m²     PHYSICAL EXAM  Physical Exam  Vitals reviewed.   Constitutional:       General: He is not in acute distress.     Appearance: Normal appearance. He is not ill-appearing, toxic-appearing or diaphoretic.   HENT:      Head: Normocephalic and atraumatic.      Nose: Nose normal.      Mouth/Throat:      Mouth: Mucous membranes are moist.   Eyes:      Conjunctiva/sclera: Conjunctivae normal.   Cardiovascular:      Rate and Rhythm: Normal rate.   Pulmonary:      Effort: Pulmonary effort is normal. No respiratory distress.   Abdominal:      General: Abdomen is flat. Bowel sounds are normal. There is no distension.      Palpations: Abdomen is soft. There is no mass.      Tenderness: There is no abdominal tenderness. There is no guarding or rebound. Negative signs include Hanson's sign, McBurney's sign and obturator sign.      Hernia: No hernia is present.   Skin:     General: Skin is warm and dry.      Coloration: Skin is not jaundiced.      Findings: No erythema or rash.   Neurological:      General: No focal deficit " present.      Mental Status: He is alert.   Psychiatric:         Mood and Affect: Mood normal.         Assessment/Plan:     1. Diarrhea, unspecified type  - CULTURE STOOL; Future  - C Diff by PCR rflx Toxin; Future  - CRYPTO/GIARDIA RAPID ASSAY; Future  - Referral to Gastroenterology  -Be seen in emergency department if symptoms acutely worsen  -Gentle diet with gradual return to normal diet as tolerated  -Maintain adequate hydration  -Return to clinic as needed  -Follow-up with primary care provider    MDM/Comments:  Patient has stable vital signs and is non-toxic appearing. Discussed supportive care with hydration, rest, and a modified diet with gradual return to normal diet.  Stool testing ordered with negative results.  Referral placed to GI for follow-up if symptoms or not resolving..  Strict ER precautions discussed with patient's if his symptoms worsen in any way.  Discussed following up with his primary care provide as well.  Patient demonstrated understanding of treatment plan at this time and will RTC if symptoms worsen or fail to resolve.     Differential diagnosis, natural history, supportive care, and indications for immediate follow-up discussed. All questions answered. Patient agrees with the plan of care.    Follow-up as needed if symptoms worsen or fail to improve to PCP, Urgent care or Emergency Room.    I have personally reviewed prior external notes and test results pertinent to today's visit.  I have independently reviewed and interpreted all diagnostics ordered during this urgent care acute visit.   Discussed management options (risks,benefits, and alternatives to treatment). Pt expresses understanding and the treatment plan was agreed upon. Questions were encouraged and answered to pt's satisfaction.    Please note that this dictation was created using voice recognition software. I have made a reasonable attempt to correct obvious errors, but I expect that there are errors of grammar and  possibly content that I did not discover before finalizing the note.

## 2023-12-28 LAB
E COLI SXT1+2 STL IA: NORMAL
SIGNIFICANT IND 70042: NORMAL
SITE SITE: NORMAL
SOURCE SOURCE: NORMAL

## 2023-12-30 LAB
BACTERIA STL CULT: NORMAL
E COLI SXT1+2 STL IA: NORMAL
SIGNIFICANT IND 70042: NORMAL
SITE SITE: NORMAL
SOURCE SOURCE: NORMAL

## 2024-05-28 ENCOUNTER — TELEPHONE (OUTPATIENT)
Dept: PHYSICAL THERAPY | Facility: MEDICAL CENTER | Age: 34
End: 2024-05-28
Payer: COMMERCIAL

## 2024-05-28 NOTE — OP THERAPY DISCHARGE SUMMARY
Outpatient Physical Therapy  DISCHARGE SUMMARY NOTE      [facility]patient Physical Therapy  [address]  [place] [address]  Phone:  [phone]  Fax:  [phone]    Date of Visit: 05/28/2024    Patient: Rich Brown  YOB: 1990  MRN: [id number]     Referring Provider: Onur Corcoran M.D.  [address]  [address]  [place] [address]   Referring Diagnosis Acute pain of left knee [M25.562];Knee instability, left [M25.362]           Functional Assessment Used        Your patient is being discharged from Physical Therapy with the following comments:   Patient has failed to schedule or reschedule follow-up visits    Comments:  Pt seen for 2 visits of skilled physical therapy with no further follow ups scheduled.     Limitations Remaining:  Please see last DOS; current limitations unknown to provider.    Recommendations:  Pt has not been seen in clinic >30 days. Pt has failed to schedule additional follow ups. Per policy, pt will need to be seen by PCP or acquire another referral prior to initiating skilled physical therapy. Pt is being discharged at this time.      Emilio Sims, PT    Date: 5/28/2024

## 2024-10-09 ENCOUNTER — APPOINTMENT (OUTPATIENT)
Dept: RADIOLOGY | Facility: IMAGING CENTER | Age: 34
End: 2024-10-09
Attending: FAMILY MEDICINE
Payer: COMMERCIAL

## 2024-10-09 ENCOUNTER — OCCUPATIONAL MEDICINE (OUTPATIENT)
Dept: URGENT CARE | Facility: PHYSICIAN GROUP | Age: 34
End: 2024-10-09
Payer: COMMERCIAL

## 2024-10-09 VITALS
HEART RATE: 89 BPM | SYSTOLIC BLOOD PRESSURE: 122 MMHG | HEIGHT: 71 IN | DIASTOLIC BLOOD PRESSURE: 80 MMHG | RESPIRATION RATE: 18 BRPM | BODY MASS INDEX: 34.02 KG/M2 | OXYGEN SATURATION: 98 % | WEIGHT: 243 LBS | TEMPERATURE: 98.3 F

## 2024-10-09 DIAGNOSIS — S29.012A STRAIN OF THORACIC BACK REGION: ICD-10-CM

## 2024-10-09 DIAGNOSIS — M62.830 SPASM OF THORACIC BACK MUSCLE: ICD-10-CM

## 2024-10-09 PROCEDURE — 3074F SYST BP LT 130 MM HG: CPT | Performed by: FAMILY MEDICINE

## 2024-10-09 PROCEDURE — 3079F DIAST BP 80-89 MM HG: CPT | Performed by: FAMILY MEDICINE

## 2024-10-09 PROCEDURE — 99203 OFFICE O/P NEW LOW 30 MIN: CPT | Performed by: FAMILY MEDICINE

## 2024-10-09 PROCEDURE — 72070 X-RAY EXAM THORAC SPINE 2VWS: CPT | Mod: TC | Performed by: FAMILY MEDICINE

## 2024-10-09 RX ORDER — KETOROLAC TROMETHAMINE 15 MG/ML
15 INJECTION, SOLUTION INTRAMUSCULAR; INTRAVENOUS ONCE
Status: COMPLETED | OUTPATIENT
Start: 2024-10-09 | End: 2024-10-09

## 2024-10-09 RX ADMIN — KETOROLAC TROMETHAMINE 15 MG: 15 INJECTION, SOLUTION INTRAMUSCULAR; INTRAVENOUS at 10:06

## 2024-10-12 ENCOUNTER — OCCUPATIONAL MEDICINE (OUTPATIENT)
Dept: URGENT CARE | Facility: PHYSICIAN GROUP | Age: 34
End: 2024-10-12
Payer: COMMERCIAL

## 2024-10-12 VITALS
WEIGHT: 243 LBS | TEMPERATURE: 98.4 F | SYSTOLIC BLOOD PRESSURE: 106 MMHG | OXYGEN SATURATION: 97 % | BODY MASS INDEX: 34.02 KG/M2 | RESPIRATION RATE: 16 BRPM | DIASTOLIC BLOOD PRESSURE: 88 MMHG | HEART RATE: 87 BPM | HEIGHT: 71 IN

## 2024-10-12 DIAGNOSIS — M62.830 SPASM OF THORACIC BACK MUSCLE: ICD-10-CM

## 2024-10-12 DIAGNOSIS — S29.019D THORACIC MYOFASCIAL STRAIN, SUBSEQUENT ENCOUNTER: ICD-10-CM

## 2024-10-12 PROCEDURE — 3079F DIAST BP 80-89 MM HG: CPT | Performed by: STUDENT IN AN ORGANIZED HEALTH CARE EDUCATION/TRAINING PROGRAM

## 2024-10-12 PROCEDURE — 99214 OFFICE O/P EST MOD 30 MIN: CPT | Performed by: STUDENT IN AN ORGANIZED HEALTH CARE EDUCATION/TRAINING PROGRAM

## 2024-10-12 PROCEDURE — 3074F SYST BP LT 130 MM HG: CPT | Performed by: STUDENT IN AN ORGANIZED HEALTH CARE EDUCATION/TRAINING PROGRAM

## 2024-10-12 RX ORDER — MELOXICAM 7.5 MG/1
7.5 TABLET ORAL
Qty: 15 TABLET | Refills: 0 | Status: SHIPPED | OUTPATIENT
Start: 2024-10-12

## 2024-10-12 RX ORDER — ACETAMINOPHEN AND CODEINE PHOSPHATE 300; 30 MG/1; MG/1
1 TABLET ORAL EVERY 6 HOURS PRN
COMMUNITY
Start: 2024-08-15

## 2024-10-12 RX ORDER — HYDROXYZINE HYDROCHLORIDE 25 MG/1
TABLET, FILM COATED ORAL
COMMUNITY
Start: 2024-09-02

## 2024-10-12 RX ORDER — METHOCARBAMOL 500 MG/1
500 TABLET, FILM COATED ORAL 4 TIMES DAILY
Qty: 30 TABLET | Refills: 0 | Status: SHIPPED | OUTPATIENT
Start: 2024-10-12 | End: 2024-10-24 | Stop reason: SDUPTHER

## 2024-10-16 ENCOUNTER — OCCUPATIONAL MEDICINE (OUTPATIENT)
Dept: URGENT CARE | Facility: PHYSICIAN GROUP | Age: 34
End: 2024-10-16
Payer: COMMERCIAL

## 2024-10-16 VITALS
BODY MASS INDEX: 34.02 KG/M2 | HEART RATE: 96 BPM | TEMPERATURE: 98.4 F | WEIGHT: 243 LBS | HEIGHT: 71 IN | SYSTOLIC BLOOD PRESSURE: 98 MMHG | DIASTOLIC BLOOD PRESSURE: 64 MMHG | RESPIRATION RATE: 16 BRPM | OXYGEN SATURATION: 98 %

## 2024-10-16 DIAGNOSIS — M62.830 SPASM OF THORACIC BACK MUSCLE: ICD-10-CM

## 2024-10-16 DIAGNOSIS — S29.019D THORACIC MYOFASCIAL STRAIN, SUBSEQUENT ENCOUNTER: ICD-10-CM

## 2024-10-16 PROCEDURE — 3074F SYST BP LT 130 MM HG: CPT | Performed by: PHYSICIAN ASSISTANT

## 2024-10-16 PROCEDURE — 3078F DIAST BP <80 MM HG: CPT | Performed by: PHYSICIAN ASSISTANT

## 2024-10-16 PROCEDURE — 99213 OFFICE O/P EST LOW 20 MIN: CPT | Performed by: PHYSICIAN ASSISTANT

## 2024-10-16 ASSESSMENT — ENCOUNTER SYMPTOMS: BACK PAIN: 1

## 2024-10-24 ENCOUNTER — OFFICE VISIT (OUTPATIENT)
Dept: URGENT CARE | Facility: PHYSICIAN GROUP | Age: 34
End: 2024-10-24
Payer: COMMERCIAL

## 2024-10-24 VITALS
SYSTOLIC BLOOD PRESSURE: 118 MMHG | HEIGHT: 71 IN | OXYGEN SATURATION: 97 % | DIASTOLIC BLOOD PRESSURE: 70 MMHG | TEMPERATURE: 97.2 F | HEART RATE: 72 BPM | WEIGHT: 243 LBS | BODY MASS INDEX: 34.02 KG/M2 | RESPIRATION RATE: 14 BRPM

## 2024-10-24 DIAGNOSIS — S29.019D THORACIC MYOFASCIAL STRAIN, SUBSEQUENT ENCOUNTER: Primary | ICD-10-CM

## 2024-10-24 DIAGNOSIS — M62.830 SPASM OF THORACIC BACK MUSCLE: ICD-10-CM
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
## 2024-10-24 DIAGNOSIS — S29.012A STRAIN OF THORACIC BACK REGION: ICD-10-CM

## 2024-10-24 PROCEDURE — 1125F AMNT PAIN NOTED PAIN PRSNT: CPT

## 2024-10-24 PROCEDURE — 99213 OFFICE O/P EST LOW 20 MIN: CPT

## 2024-10-24 PROCEDURE — 3078F DIAST BP <80 MM HG: CPT

## 2024-10-24 PROCEDURE — 3074F SYST BP LT 130 MM HG: CPT

## 2024-10-24 RX ORDER — METHOCARBAMOL 500 MG/1
500 TABLET, FILM COATED ORAL 4 TIMES DAILY
Qty: 30 TABLET | Refills: 0 | Status: SHIPPED | OUTPATIENT
Start: 2024-10-24 | End: 2024-10-24

## 2024-10-24 RX ORDER — LIDOCAINE 50 MG/G
1 PATCH TOPICAL EVERY 24 HOURS
Qty: 10 PATCH | Refills: 0 | Status: SHIPPED | OUTPATIENT
Start: 2024-10-24

## 2024-10-24 RX ORDER — METHOCARBAMOL 500 MG/1
500 TABLET, FILM COATED ORAL 4 TIMES DAILY
Qty: 30 TABLET | Refills: 0 | Status: SHIPPED | OUTPATIENT
Start: 2024-10-24

## 2024-10-24 ASSESSMENT — ENCOUNTER SYMPTOMS
FEVER: 0
EYE DISCHARGE: 0
VOMITING: 0
ABDOMINAL PAIN: 0
WHEEZING: 0
SPUTUM PRODUCTION: 0
DIZZINESS: 0
COUGH: 0
SORE THROAT: 0
BACK PAIN: 1
EYE REDNESS: 0
EYE PAIN: 0
STRIDOR: 0
NAUSEA: 0
DIARRHEA: 0
CHILLS: 0
PALPITATIONS: 0
HEADACHES: 0
MYALGIAS: 0
SHORTNESS OF BREATH: 0

## 2024-10-24 ASSESSMENT — PAIN SCALES - GENERAL: PAINLEVEL: 6=MODERATE PAIN

## 2024-10-29 ENCOUNTER — OCCUPATIONAL MEDICINE (OUTPATIENT)
Dept: OCCUPATIONAL MEDICINE | Facility: CLINIC | Age: 34
End: 2024-10-29
Payer: COMMERCIAL

## 2024-10-29 VITALS
WEIGHT: 247 LBS | HEIGHT: 71 IN | BODY MASS INDEX: 34.58 KG/M2 | RESPIRATION RATE: 18 BRPM | HEART RATE: 87 BPM | TEMPERATURE: 97.4 F | DIASTOLIC BLOOD PRESSURE: 74 MMHG | SYSTOLIC BLOOD PRESSURE: 122 MMHG | OXYGEN SATURATION: 98 %

## 2024-10-29 DIAGNOSIS — S29.019D THORACIC MYOFASCIAL STRAIN, SUBSEQUENT ENCOUNTER: ICD-10-CM

## 2024-11-14 ENCOUNTER — PATIENT MESSAGE (OUTPATIENT)
Dept: SPORTS MEDICINE | Facility: OTHER | Age: 34
End: 2024-11-14
Payer: COMMERCIAL

## 2024-11-21 ENCOUNTER — PHYSICAL THERAPY (OUTPATIENT)
Dept: PHYSICAL THERAPY | Facility: MEDICAL CENTER | Age: 34
End: 2024-11-21
Attending: PREVENTIVE MEDICINE
Payer: COMMERCIAL

## 2024-11-21 DIAGNOSIS — S29.019D THORACIC MYOFASCIAL STRAIN, SUBSEQUENT ENCOUNTER: ICD-10-CM

## 2024-11-21 PROCEDURE — 97014 ELECTRIC STIMULATION THERAPY: CPT

## 2024-11-21 PROCEDURE — 97110 THERAPEUTIC EXERCISES: CPT

## 2024-11-21 PROCEDURE — 97161 PT EVAL LOW COMPLEX 20 MIN: CPT

## 2024-11-21 ASSESSMENT — ENCOUNTER SYMPTOMS
PAIN SCALE: 4
PAIN SCALE AT HIGHEST: 8
PAIN LOCATION: CENTRAL LOW BACK
PAIN SCALE AT LOWEST: 0

## 2024-11-21 NOTE — OP THERAPY EVALUATION
"  Outpatient Physical Therapy  INITIAL EVALUATION    [facility] Outpatient Physical Therapy  [address]  [place] [address]  Phone:  [phone]  Fax:  [phone]    Date of Evaluation: 11/21/2024    Patient: Rich Brown  YOB: 1990  MRN: [id number]     Referring Provider: Rene Alonso D.O.  [address] [facility] [address]  [place] [address]   Referring Diagnosis Thoracic myofascial strain, subsequent encounter [S29.019D]     Time Calculation  Start time: 0730  Stop time: 0825 Time Calculation (min): 55 minutes         Chief Complaint: Back Problem and Work-Related Injury    Visit Diagnoses     ICD-10-CM   1. Thoracic myofascial strain, subsequent encounter  S29.019D       Date of onset of impairment: No data found    Subjective:   History of Present Illness:     Mechanism of injury:  Pt is a 34 y.o male presenting to physical therapy with complaints of upper back strain. Pt has a PMH of ACL repair. Pt reports on October 7th, he was moving a large box (about 300 lbs) that he was moving from one pallet to another, did not feel the strain during the move but afterwards he started to feel the strain in his back. Notes this is not a typical task that they do, but his boss needed stuff moved. Notes they do lift 100 lbs regularly. Notes he hasn't been back to work since this injury. Notes that he was given hard restrictions for lifting over 20lbs.     Per MD on 10/29/2024:\"CONCEPCIÓN: He was moving power walls from a broken pallet in a pallet while lifting these he felt sudden pain on in the mid back.  He was seen in urgent care x 3.  Advised NSAIDs, muscle relaxers and lidocaine patches.       10/29/2024: Patient states that overall symptoms are about the same. He is only had minimal improvement.  Pain is mostly in the mid to lower thoracic spine. Denies radiating pain, numbness or tingling.  He states the most helpful thing thus far has been the Robaxin that he was " "prescribed.  He states he takes it twice a day usually for pain relief.  He states that he has been off work due to restrictions.  He denies any other prior back injuries.\"      Pt denies dizziness, nausea, headaches, numbness/tingling into extremities, and recent visual changes. Pt consents to evaluation and treatment today.     Pain:     Current pain ratin    At best pain ratin    At worst pain ratin    Location:  Central low back    Pain Comments::  Quality: locking up     Aggravating Factors: household chores (dishes), bending, standing for more than 45 minutes, sitting in a chair that is uncomfortable     Relieving Factors: sitting in his ergonomic chair, lying down, muscle relaxors, lidocaine patch - has not used yet, heat  Activities of Daily Living:     Patient reported ADL status: Pt reported ADLs: dishes, taking out trash that is heavy - has felt pain/difficulties performing these, he lives alone but has to still do it.   Work: [employer]; , 12 hour night shifts - on feet for 12 hours, lifting up to 110 lbs throughout the shift , lifting floor height to chest   Exercise: home workouts with light weight and resistance (upper body exercise), lower body stretching - 3x a week      Patient Goals:     Patient goals for therapy:  Decreased pain, increased strength, return to work and increased motion      No past medical history on file.  Past Surgical History:   Procedure Laterality Date    REPAIR, KNEE, ACL Left     left knee    ARCH BAR REMOVAL OR REPAIR  10/28/2008    Performed by ORLY MON at [facility]ERY [facility]    ARCH BAR APPLICATION  2008    Performed by KAREY MILES at SURGERY [facility] ORS    CLOSED REDUCTION  2008    Performed by KAREY MILES at SURGERY [facility] ORS     Social History     Tobacco Use    Smoking status: Some Days     Current packs/day: 0.50     Average packs/day: 0.5 packs/day for 6.0 years (3.0 ttl pk-yrs)    "  Types: Cigarettes    Smokeless tobacco: Never   Substance Use Topics    Alcohol use: Not Currently     Comment: rarely      Family and Occupational History     Socioeconomic History    Marital status: Single     Spouse name: Not on file    Number of children: Not on file    Years of education: Not on file    Highest education level: Not on file   Occupational History    Not on file       Objective     Observations   Central spine     Positive for forward head/neck, rounded shoulders and thoracic kyphosis.    Postural Observations  Seated posture: poor  Standing posture: poor      Shoulder Screen    Shoulder active range of motion within functional limits.  Shoulder range of motion within functional limits with the following exceptions: Limited ER BTH due to pain at R thoracic spine    Palpation   Left   Hypertonic in the thoracic paraspinals.   Tenderness of the rhomboids and thoracic paraspinals.     Right   Hypertonic in the thoracic paraspinals.   Tenderness of the middle trapezius, rhomboids and thoracic paraspinals.     Active Range of Motion     Cervical Spine   Flexion: within functional limits  Extension: within functional limits  Retraction: within functional limits  Left lateral flexion: decreased  Right lateral flexion: decreased  Left rotation: within functional limits  Right rotation: within functional limits    Lumbar   Flexion: within functional limits  Extension: decreased (pain)  Left lateral flexion: within functional limits  Right lateral flexion: within functional limits  Left rotation: decreased (pain)  Right rotation: decreased    Additional Active Range of Motion Details  T/S rotation limited by 2/3 ROM bilaterally, L rotation worse than R, unable to assess PROM due to pain with overpressure in sitting     Joint Play   Spine     Central [place]        T1: WFL with grade 2       T2: WFL with grade 2       T3: hypomobile with grade 2       T4: hypomobile and painful with grade 2       T5:  hypomobile and painful with grade 2       T6: hypomobile and painful with grade 2       T7: hypomobile and painful with grade 2       T8: hypomobile and painful with grade 2      Strength:      Upper extremities   Left upper extremity strength within functional limits  Right upper extremity strength within functional limits        Therapeutic Exercises (CPT 87380):     1. pt education, Exam Findings: POC    2. cat cow, x5    3. open book, x5    4. L stretch (seated), x30 seconds      Therapeutic Exercise Summary: Pt performed these exercises with instruction and SPV.  Provided handout of these exercises for daily HEP.     Access Code: [email or web address]  URL: [email or web address]  Date: 11/21/2024  Prepared by: Sera Hernandez    Exercises  - Sidelying Open Book Thoracic Lumbar Rotation and Extension  - 1 x daily - 7 x weekly - 3 sets - 10 reps - 5 seconds hold  - Cat Cow  - 1 x daily - 7 x weekly - 3 sets - 10 reps - 5 seconds hold  - Standing 'L' Stretch at Counter  - 1 x daily - 7 x weekly - 3 sets - 30 seconds - 1 minutes hold    Therapeutic Treatments and Modalities:     1. E Stim Unattended (CPT 92457), t/s with mhp x10 mins    Time-based treatments/modalities:    Physical Therapy Timed Treatment Charges  Therapeutic exercise minutes (CPT 49932): 8 minutes      Assessment, Response and Plan:   Impairments: abnormal or restricted ROM, activity intolerance, difficulty performing job, hypersensitivity, lacks appropriate home exercise program, limited ADL's, limited mobility and pain with function    Assessment details:  Pt is a 34 y.o male presenting to physical therapy with complaints of upper back strain following a work injury. Pt has a PMH of ACL repair. Pt presents with a mid-thoracic paraspinal strain following a lifting injury during work. Pt presents today with the following s/s: decreased l/s AROM in standing (extension, and BL rotation), decreased t/s extension and rotation bilaterally with pain, TTP and  hypertonic thoracic paraspinals, TTP over BL rhomboids and middle trap. Pt presenting with antalgic movement and increased hypersensitivity to minimal pressure today. Reports activity intolerance with activities such as standing >45 minutes, bending fwd to lift something from floor, performing household chores, ect. Pt has not returned to work since injury as his employer has not offered him to perform with light duties or another department including desk work. Per MD, pt is on a 20lb lifting restriction if he were to return to work. His current duties include a 12 hour shift with standing, twistng, bending, and lifting >/= to 100lbs of heavy material.     Pt educated on exam findings and future POC, pt acknowledged understanding and is agreeable. Pt will benefit from skilled physical therapy to address the following impairments in order to improve functional mobility and overall QOL.  Pt should progress well towards goals if compliant with HEP and POC.     Prognosis: good    Goals:   Short Term Goals:   1. Pt will report independence and compliance with written HEP   2. Pt will demo improved t/s and l/s AROM to WFL in order to return to improved mobility to create ease with bending to floor to lift an object  3. Pt will report >/= to 2 hours standing/walking tolerance with no rest breaks in order to return to work type tasks   Short term goal time span:  2-4 weeks      Long Term Goals:    1. Pt will report independence and compliance with written HEP   2. Pt will report >/= to 3 hours standing/walking tolerance with no rest breaks in order to return to work type tasks   3. Pt will demo improved periscapular strength to WFL in order to lift a heavy box to/from floor with good upright mecancis  4. Pt will demo floor to table lift of >/= to 50lbs in order to return to work duties with confidence and ease  5. Pt will have a significant improvement in RMQ with MCID of >/= to 5 points (eval:  37.5)    Long term goal  time span:  6-8 weeks    Plan:   Therapy options:  Physical therapy treatment to continue  Planned therapy interventions:  E Stim Unattended (CPT 53422), Neuromuscular Re-education (CPT 43159), Therapeutic Exercise (CPT 75031), Therapeutic Activities (CPT 74651) and Manual Therapy (CPT 21231)  Frequency: 1-2x per week.  Duration in weeks:  8  Discussed with:  Patient  Plan details:  UPOC: 01/10/2025      Functional Assessment Used  Gt Daniel Low Back Pain and Disability Score: 37.5     Referring provider co-signature:  I have reviewed this plan of care and my co-signature certifies the need for services.    Certification Period: 11/21/2024 to  01/10/2025    Physician Signature: ________________________________ Date: ______________

## 2024-11-26 ENCOUNTER — OCCUPATIONAL MEDICINE (OUTPATIENT)
Dept: OCCUPATIONAL MEDICINE | Facility: CLINIC | Age: 34
End: 2024-11-26
Payer: COMMERCIAL

## 2024-11-26 VITALS
TEMPERATURE: 97.3 F | WEIGHT: 243 LBS | DIASTOLIC BLOOD PRESSURE: 70 MMHG | SYSTOLIC BLOOD PRESSURE: 100 MMHG | HEIGHT: 71 IN | HEART RATE: 93 BPM | RESPIRATION RATE: 16 BRPM | BODY MASS INDEX: 34.02 KG/M2

## 2024-11-26 DIAGNOSIS — S29.019D THORACIC MYOFASCIAL STRAIN, SUBSEQUENT ENCOUNTER: ICD-10-CM

## 2024-11-26 ASSESSMENT — PAIN SCALES - GENERAL: PAINLEVEL_OUTOF10: 2=MINIMAL-SLIGHT

## 2024-11-26 NOTE — LETTER
PHYSICIAN’S AND CHIROPRACTIC PHYSICIAN'S   PROGRESS REPORT   CERTIFICATION OF DISABILITY Claim Number:     Social Security Number:    Patient’s Name: Rich Brown Date of Injury: 10/9/2024   Employer: [employer] Name of MCO (if applicable):      Patient’s Job Description/Occupation:        Previous Injuries/Diseases/Surgeries Contributing to the Condition:         Diagnosis: (S29.019D) Thoracic myofascial strain, subsequent encounter      Related to the Industrial Injury? Yes     Explain:        Objective Medical Findings: Thoracic: No gross deformity.  Tenderness palpation over the mid to lower thoracic paraspinal musculature and midline.  Range of motion minimally diminished with flexion and rotation due to pain.            None - Discharged                         Stable  No                 Ratable  No        Generally Improved                         Condition Worsened               X   Condition Same  May Have Suffered a Permanent Disability No     Treatment Plan:    Continue physical therapy  Continue Robaxin and lidocaine patches as needed  May use OTC Aleve/ibuprofen as needed  May use heat/ice as needed.  Gentle range of motion exercises as tolerated.  Will reevaluate in a few weeks, if symptoms improving well lessen work restrictions somewhat           No Change in Therapy                  PT/OT Prescribed                      Medication May be Used While Working        Case Management                          PT/OT Discontinued    Consultation    Further Diagnostic Studies:    Prescription(s)                 Released to FULL DUTY /No Restrictions on (Date):       Certified TOTALLY TEMPORARILY DISABLED (Indicate Dates) From:   To:    X  Released to RESTRICTED/Modified Duty on (Date): From: 11/26/2024 To: 12/23/2024  Restrictions Are:  Temporary      No Sitting    No Standing    No Pulling Other:     X    No Bending at Waist X    No Stooping     No Lifting        No Carrying     No  Walking Lifting Restricted to (lbs.):  < or = to 10 pounds       No Pushing        No Climbing     No Reaching Above Shoulders       Date of Next Visit:  Monday 12/23/2024  @ 8:45 AM Date of this Exam: 11/26/2024 Physician/Chiropractic Physician Name: Rene Alonso D.O. Physician/Chiropractic Physician Signature:  Reen Alonso DO MPH     [place]:  [address] [place] [address], [address] [place], [place] [address] - Telephone [phone] [place]:  [address] [place] [address], [address] [place], [place] [address] - Telephone [phone]    [email or web address]  D-39 (Rev. 10/24)

## 2024-11-26 NOTE — OP THERAPY DAILY TREATMENT
Outpatient Physical Therapy  DAILY TREATMENT     [facility] [place] [facility] Outpatient Physical Therapy  [address]  [place] [address]  Phone:  [phone]  Fax:  [phone]    Date: 11/27/2024    Patient: Rich Brown  YOB: 1990  MRN: [id number]     Time Calculation    Start time: 0730  Stop time: 0830 Time Calculation (min): 60 minutes         Chief Complaint: Back Problem    Visit #: 2    SUBJECTIVE:  Pt states that he had a flare up the last couple of days, wasn't sure what caused the pain. Did not try the exercises when he has a flare up, but did note they felt good when he tried them.     OBJECTIVE:  Current objective measures:           Therapeutic Exercises (CPT 94571):     1. UBE, x6, cardiovascular warm up    2. cat cow, x5    3. open book, x5    4. 3 way seated ball rolls, x30 seconds    5. diaphragmetic breathing, x15 4 seconds in/4 seconds out    6. supine shoulder ER with OTB (doubled up), 2x 10 10 seconds isos      Therapeutic Exercise Summary: Pt performed these exercises with instruction and SPV.  Provided handout of these exercises for daily HEP.     Access Code: [email or web address]  URL: [email or web address]  Date: 11/21/2024  Prepared by: Sera Hernandez    Exercises  - Sidelying Open Book Thoracic Lumbar Rotation and Extension  - 1 x daily - 7 x weekly - 3 sets - 10 reps - 5 seconds hold  - Cat Cow  - 1 x daily - 7 x weekly - 3 sets - 10 reps - 5 seconds hold  - Standing 'L' Stretch at Counter  - 1 x daily - 7 x weekly - 3 sets - 30 seconds - 1 minutes hold    Therapeutic Treatments and Modalities:     1. E Stim Unattended (CPT 30854), t/s with mhp x10 mins    2. Manual Therapy (CPT 14885),  to t/s spine, rotatoinal mobs to mid thoracic    Time-based treatments/modalities:    Physical Therapy Timed Treatment Charges  Manual therapy minutes (CPT 12970): 10 minutes  Therapeutic exercise minutes (CPT 63434): 30 minutes      Pain rating (1-10)  before treatment:  5 - mid line t/s and into upper l/s   Pain rating (1-10) after treatment:  4    ASSESSMENT:   Response to treatment: Pt tolerated today's treatment session fairly. Pt with increased tension at t/s paraspinals today with hypersensitivity to light pressure. Gr II  were utilized at t/s today to increase extension and address pain. Gentle periscapular strengthening and diaphragmatic breathing were tolerated well.     PLAN/RECOMMENDATIONS:   Plan for treatment: therapy treatment to continue next visit.  Planned interventions for next visit: continue with current treatment.

## 2024-11-27 ENCOUNTER — PHYSICAL THERAPY (OUTPATIENT)
Dept: PHYSICAL THERAPY | Facility: MEDICAL CENTER | Age: 34
End: 2024-11-27
Attending: PREVENTIVE MEDICINE
Payer: COMMERCIAL

## 2024-11-27 DIAGNOSIS — S29.019D THORACIC MYOFASCIAL STRAIN, SUBSEQUENT ENCOUNTER: ICD-10-CM

## 2024-11-27 PROCEDURE — 97140 MANUAL THERAPY 1/> REGIONS: CPT

## 2024-11-27 PROCEDURE — 97014 ELECTRIC STIMULATION THERAPY: CPT

## 2024-11-27 PROCEDURE — 97110 THERAPEUTIC EXERCISES: CPT

## 2024-11-27 NOTE — PROGRESS NOTES
"Subjective:     Rich Brown is a 34 y.o. male who presents for Follow-Up (( FV / DOI 10.9.24 / Back / [employer]) RM 16)    CONCEPCIÓN: He was moving power walls from a broken pallet in a pallet while lifting these he felt sudden pain on in the mid back.  He was seen in urgent care x 3.  Advised NSAIDs, muscle relaxers and lidocaine patches.       10/29/2024: Patient states that overall symptoms are about the same.  He is only had minimal improvement.  Pain is mostly in the mid to lower thoracic spine.  Denies radiating pain, numbness or tingling.  He states the most helpful thing thus far has been the Robaxin that he was prescribed.  He states he takes it twice a day usually for pain relief.  He states that he has been off work due to restrictions.  He denies any other prior back injuries    11/26/2024: Symptoms unchanged.  Continues to have pain in the mid back.  Denies radicular symptoms.  Just started physical therapy couple weeks ago for the initial evaluation, has first treatment scheduled for tomorrow.  He states that some days pain is pretty minimal but other days pain is more moderate.  Taking less of the Robaxin.    ROS    SOCHX: Works as a  at [employer]  FH: No pertinent family history to this problem.       Objective:     /70 (BP Location: Right arm, Patient Position: Sitting, BP Cuff Size: Adult)   Pulse 93   Temp 36.3 °C (97.3 °F)   Resp 16   Ht 1.803 m (5' 11\")   Wt 110 kg (243 lb)   BMI 33.89 kg/m²     Constitutional: Patient is in no acute distress. Appears well-developed and well-nourished.   Cardiovascular: Normal rate.    Pulmonary/Chest: Effort normal. No respiratory distress.   Neurological: Patient is alert and oriented to person, place, and time.   Skin: Skin is warm and dry.   Psychiatric: Normal mood and affect. Behavior is normal.     Thoracic: No gross deformity.  Tenderness palpation over the mid to lower thoracic paraspinal musculature and midline.  Range " of motion minimally diminished with flexion and rotation due to pain.       Assessment/Plan:     1. Thoracic myofascial strain, subsequent encounter      Continue physical therapy  Continue Robaxin and lidocaine patches as needed  May use OTC Aleve/ibuprofen as needed  May use heat/ice as needed.  Gentle range of motion exercises as tolerated.  Will reevaluate in a few weeks, if symptoms improving well lessen work restrictions somewhat      Work Status: Restricted Duty - see D-39 or other state/federal worker's compensation forms for specific restrictions if applicable  Follow up 4 weeks    Differential diagnosis, natural history, supportive care, and indications for immediate follow-up discussed.    Approximately 21 minutes were spent in reviewing notes, preparing for visit, obtaining history, exam and evaluation, patient counseling/education, and post visit documentation/orders. Significant time was spent completing state/federal worker's compensation forms.

## 2024-11-29 ENCOUNTER — OFFICE VISIT (OUTPATIENT)
Dept: URGENT CARE | Facility: PHYSICIAN GROUP | Age: 34
End: 2024-11-29
Payer: COMMERCIAL

## 2024-11-29 VITALS
HEART RATE: 94 BPM | TEMPERATURE: 97.8 F | RESPIRATION RATE: 18 BRPM | WEIGHT: 245.6 LBS | DIASTOLIC BLOOD PRESSURE: 78 MMHG | BODY MASS INDEX: 34.38 KG/M2 | SYSTOLIC BLOOD PRESSURE: 126 MMHG | OXYGEN SATURATION: 99 % | HEIGHT: 71 IN

## 2024-11-29 DIAGNOSIS — S05.02XA ABRASION OF LEFT CORNEA, INITIAL ENCOUNTER: ICD-10-CM

## 2024-11-29 RX ORDER — ERYTHROMYCIN 5 MG/G
1 OINTMENT OPHTHALMIC 4 TIMES DAILY
Qty: 1 G | Refills: 0 | Status: SHIPPED | OUTPATIENT
Start: 2024-11-29 | End: 2024-12-06

## 2024-11-29 ASSESSMENT — ENCOUNTER SYMPTOMS
EYE REDNESS: 1
EYE DISCHARGE: 1
BLURRED VISION: 0
DOUBLE VISION: 0
EYE PAIN: 1
PHOTOPHOBIA: 1

## 2024-11-29 ASSESSMENT — PAIN SCALES - GENERAL: PAINLEVEL_OUTOF10: 6=MODERATE PAIN

## 2024-11-29 NOTE — PROGRESS NOTES
"  Subjective:     Rich Brown  is a 34 y.o. male who presents for Pain (Eye pain /Drooping with sensitivity to light x 3 days )       He presents today with left thigh sensitivity, discomfort has been ongoing over the last 3 days.  Has also been experiencing increased tear production over the left eye.  No mucopurulent drainage or discharge.  Denies any specific known close sick contacts but has been out in public over the past week.  He does not wear contacts.  No trauma or injury associated with symptom onset.  No pain with eye movements.  No blurry vision.       Review of Systems   Eyes:  Positive for photophobia, pain, discharge and redness. Negative for blurred vision and double vision.      No Known Allergies  History reviewed. No pertinent past medical history.     Objective:   /78 (BP Location: Left arm, Patient Position: Sitting, BP Cuff Size: Adult)   Pulse 94   Temp 36.6 °C (97.8 °F) (Temporal)   Resp 18   Ht 1.803 m (5' 11\")   Wt 111 kg (245 lb 9.6 oz)   SpO2 99%   BMI 34.25 kg/m²   Physical Exam  Vitals and nursing note reviewed.   Constitutional:       General: He is not in acute distress.     Appearance: He is not ill-appearing or toxic-appearing.   HENT:      Head: Normocephalic.      Nose: No rhinorrhea.   Eyes:      General: No scleral icterus.        Right eye: No discharge.         Left eye: Discharge present.     Extraocular Movements: Extraocular movements intact.      Pupils: Pupils are equal, round, and reactive to light.        Comments: Fluorescein examination stain of the left eye does reveal corneal abrasion over the above marked region of the left eye.   Pulmonary:      Effort: Pulmonary effort is normal. No respiratory distress.      Breath sounds: No stridor.   Musculoskeletal:      Cervical back: Neck supple.   Neurological:      Mental Status: He is alert and oriented to person, place, and time.   Psychiatric:         Mood and Affect: Mood normal.         " Behavior: Behavior normal.         Thought Content: Thought content normal.         Judgment: Judgment normal.             Diagnostic testing: None    Assessment/Plan:     Encounter Diagnoses   Name Primary?    Abrasion of left cornea, initial encounter           Plan for care for today's complaint includes starting the patient on erythromycin ointment for corneal abrasion present over the left eye seen on fluorescein dye examination today.  Discussed with patient to limit his outside exposure to sunlight, if he does need to go outdoors then I recommend he wear polarized sunglasses for protection of the left eye.  No signs of bacterial conjunctivitis at this time.  Vital signs were stable during today's office visit, patient was overall well-appearing. Continue to monitor symptoms and return to urgent care or follow-up with primary care provider if symptoms remain ongoing.  Follow-up in the emergency department if symptoms become severe, ER precautions discussed in office today..  Prescription for erythromycin ointment provided.    See AVS Instructions below for written guidance provided to patient on after-visit management and care in addition to our verbal discussion during the visit.    Please note that this dictation was created using voice recognition software. I have attempted to correct all errors, but there may be sound-alike, spelling, grammar and possibly content errors that I did not discover before finalizing the note.    Calin Alcantara PA-C

## 2024-12-03 ENCOUNTER — APPOINTMENT (OUTPATIENT)
Dept: MEDICAL GROUP | Facility: MEDICAL CENTER | Age: 34
End: 2024-12-03
Payer: COMMERCIAL

## 2024-12-03 VITALS
BODY MASS INDEX: 33.95 KG/M2 | WEIGHT: 242.51 LBS | HEART RATE: 102 BPM | RESPIRATION RATE: 16 BRPM | DIASTOLIC BLOOD PRESSURE: 68 MMHG | SYSTOLIC BLOOD PRESSURE: 110 MMHG | HEIGHT: 71 IN | OXYGEN SATURATION: 98 %

## 2024-12-03 DIAGNOSIS — Z11.4 SCREENING FOR HIV (HUMAN IMMUNODEFICIENCY VIRUS): ICD-10-CM

## 2024-12-03 DIAGNOSIS — Z00.00 ANNUAL PHYSICAL EXAM: ICD-10-CM

## 2024-12-03 DIAGNOSIS — R10.13 EPIGASTRIC PAIN: ICD-10-CM

## 2024-12-03 DIAGNOSIS — Z11.59 NEED FOR HEPATITIS C SCREENING TEST: ICD-10-CM

## 2024-12-03 DIAGNOSIS — F41.0 PANIC ATTACKS: ICD-10-CM

## 2024-12-03 DIAGNOSIS — Z72.0 TOBACCO USER: ICD-10-CM

## 2024-12-03 DIAGNOSIS — Z11.3 ROUTINE SCREENING FOR STI (SEXUALLY TRANSMITTED INFECTION): ICD-10-CM

## 2024-12-03 DIAGNOSIS — K21.9 GASTROESOPHAGEAL REFLUX DISEASE WITHOUT ESOPHAGITIS: ICD-10-CM

## 2024-12-03 PROCEDURE — 99395 PREV VISIT EST AGE 18-39: CPT | Performed by: NURSE PRACTITIONER

## 2024-12-03 PROCEDURE — 3078F DIAST BP <80 MM HG: CPT | Performed by: NURSE PRACTITIONER

## 2024-12-03 PROCEDURE — 3074F SYST BP LT 130 MM HG: CPT | Performed by: NURSE PRACTITIONER

## 2024-12-03 PROCEDURE — 99214 OFFICE O/P EST MOD 30 MIN: CPT | Mod: 25 | Performed by: NURSE PRACTITIONER

## 2024-12-03 RX ORDER — PANTOPRAZOLE SODIUM 20 MG/1
20 TABLET, DELAYED RELEASE ORAL 2 TIMES DAILY
Qty: 180 TABLET | Refills: 1 | Status: SHIPPED | OUTPATIENT
Start: 2024-12-03

## 2024-12-03 ASSESSMENT — ENCOUNTER SYMPTOMS
TREMORS: 0
BRUISES/BLEEDS EASILY: 0
POLYDIPSIA: 0
CONSTIPATION: 0
FOCAL WEAKNESS: 0
MYALGIAS: 0
WEAKNESS: 0
ROS GI COMMENTS: EPIGASTRIC DISCOMFORT
SPUTUM PRODUCTION: 0
LOSS OF CONSCIOUSNESS: 0
DIZZINESS: 0
SPEECH CHANGE: 0
DEPRESSION: 0
SENSORY CHANGE: 0
INSOMNIA: 0
BACK PAIN: 0
EYE PAIN: 0
FEVER: 0
SINUS PAIN: 0
EYE DISCHARGE: 0
FLANK PAIN: 0
BLOOD IN STOOL: 0
SORE THROAT: 0
HEADACHES: 0
PALPITATIONS: 0
WEIGHT LOSS: 0
WHEEZING: 0
CHILLS: 0
SHORTNESS OF BREATH: 0
ABDOMINAL PAIN: 1
EYE REDNESS: 0
VOMITING: 0
NERVOUS/ANXIOUS: 0
COUGH: 0
DIARRHEA: 0
NAUSEA: 0

## 2024-12-03 ASSESSMENT — ANXIETY QUESTIONNAIRES
IF YOU CHECKED OFF ANY PROBLEMS ON THIS QUESTIONNAIRE, HOW DIFFICULT HAVE THESE PROBLEMS MADE IT FOR YOU TO DO YOUR WORK, TAKE CARE OF THINGS AT HOME, OR GET ALONG WITH OTHER PEOPLE: NOT DIFFICULT AT ALL
2. NOT BEING ABLE TO STOP OR CONTROL WORRYING: NOT AT ALL
1. FEELING NERVOUS, ANXIOUS, OR ON EDGE: SEVERAL DAYS
7. FEELING AFRAID AS IF SOMETHING AWFUL MIGHT HAPPEN: SEVERAL DAYS
3. WORRYING TOO MUCH ABOUT DIFFERENT THINGS: NOT AT ALL
6. BECOMING EASILY ANNOYED OR IRRITABLE: NOT AT ALL
GAD7 TOTAL SCORE: 3
5. BEING SO RESTLESS THAT IT IS HARD TO SIT STILL: SEVERAL DAYS
4. TROUBLE RELAXING: NOT AT ALL

## 2024-12-03 ASSESSMENT — PATIENT HEALTH QUESTIONNAIRE - PHQ9: CLINICAL INTERPRETATION OF PHQ2 SCORE: 0

## 2024-12-03 NOTE — OP THERAPY DAILY TREATMENT
Outpatient Physical Therapy  DAILY TREATMENT     [facility] Outpatient Physical Therapy  [address]  [place] [address]  Phone:  [phone]  Fax:  [phone]    Date: 12/04/2024    Patient: Rich Brown  YOB: 1990  MRN: [id number]     Time Calculation                   Chief Complaint: No chief complaint on file.    Visit #: 3    SUBJECTIVE:  Pt states    OBJECTIVE:  Current objective measures:           Therapeutic Exercises (CPT 91792):     1. UBE, x6, cardiovascular warm up    2. cat cow, x5    3. open book, x5    4. 3 way seated ball rolls, x30 seconds    5. diaphragmetic breathing, x15 4 seconds in/4 seconds out    6. supine shoulder ER with OTB (doubled up), 2x 10 10 seconds isos      Therapeutic Exercise Summary: Pt performed these exercises with instruction and SPV.  Provided handout of these exercises for daily HEP.     Access Code: [email or web address]  URL: [email or web address]  Date: 11/21/2024  Prepared by: Sera Hernandez    Exercises  - Sidelying Open Book Thoracic Lumbar Rotation and Extension  - 1 x daily - 7 x weekly - 3 sets - 10 reps - 5 seconds hold  - Cat Cow  - 1 x daily - 7 x weekly - 3 sets - 10 reps - 5 seconds hold  - Standing 'L' Stretch at Counter  - 1 x daily - 7 x weekly - 3 sets - 30 seconds - 1 minutes hold    Therapeutic Treatments and Modalities:     1. E Stim Unattended (CPT 90349), t/s with mhp x10 mins    2. Manual Therapy (CPT 77484),  to t/s spine, rotatoinal mobs to mid thoracic    Time-based treatments/modalities:           Pain rating (1-10) before treatment:  {PAIN NUMBERS_1-10:15550}  Pain rating (1-10) after treatment:  {PAIN NUMBERS_1-10:79336}    ASSESSMENT:   Response to treatment:     Pt tolerated today's treatment session fairly. Pt with increased tension at t/s paraspinals today with hypersensitivity to light pressure. Gr II  were utilized at t/s today to increase extension and address pain.  Gentle periscapular strengthening and diaphragmatic breathing were tolerated well.  Plan to reintroduce core and postural strengthening to support work type tasks.     PLAN/RECOMMENDATIONS:   Plan for treatment: therapy treatment to continue next visit.  Planned interventions for next visit: continue with current treatment.

## 2024-12-04 ENCOUNTER — APPOINTMENT (OUTPATIENT)
Dept: PHYSICAL THERAPY | Facility: MEDICAL CENTER | Age: 34
End: 2024-12-04
Attending: PREVENTIVE MEDICINE
Payer: COMMERCIAL

## 2024-12-04 NOTE — PROGRESS NOTES
Patient agreed to using ANALIA: yes    Rich was seen today for establish care.    Diagnoses and all orders for this visit:    Tobacco user  -     nicotine (NICODERM) 7 MG/24HR PATCH 24 HR; Place 1 Patch on the skin every 24 hours.    Gastroesophageal reflux disease without esophagitis  -     pantoprazole (PROTONIX) 20 MG tablet; Take 1 Tablet by mouth 2 times a day.  -     H.PYLORI STOOL ANTIGEN; Future    Epigastric pain  -     H.PYLORI STOOL ANTIGEN; Future    Annual physical exam  -     CBC WITHOUT DIFFERENTIAL; Future  -     Comp Metabolic Panel; Future  -     HEMOGLOBIN A1C; Future  -     Lipid Profile; Future  -     VITAMIN D,25 HYDROXY (DEFICIENCY); Future  -     TSH WITH REFLEX TO FT4; Future    Panic attacks    Screening for HIV (human immunodeficiency virus)  -     HIV AG/AB COMBO ASSAY SCREENING; Future    Need for hepatitis C screening test  -     HEP C VIRUS ANTIBODY; Future    Routine screening for STI (sexually transmitted infection)  -     Chlamydia/GC, PCR (Urine); Future              Assessment & Plan  1. Nicotine Dependence.  He is trying to quit smoking and has switched to vaping. He has previously used nicotine patches but continues to struggle with quitting. Nicotine patches 7 mg were prescribed to aid in smoking cessation. If anxiety persists, Wellbutrin may be considered to help manage mood and anxiety.  Patient counseled on smoking cessation.    2. Gastroesophageal Reflux Disease (GERD).  3.  Gastric tenderness  Chronic intermittent condition.  He experiences severe epigastric pain that is not relieved by over-the-counter medications like Tums or Pepto-Bismol. Protonix was prescribed for his acid reflux. He was advised to abstain from alcohol and drink alkaline water. An H. pylori stool test was ordered to rule out infection.    4. Anxiety.  Chronic intermittent condition.  He reported episodes of sudden physical changes, including clammy and sweaty hands, racing heart, and tingling in his  hands and feet. These symptoms may be related to panic attacks or gastrointestinal issues. If symptoms persist, a referral to cardiology may be considered to rule out cardiac causes.  It has been evaluated twice at [facility] without any findings of cardiac etiology per patient.  Will obtain records.    5. Health Maintenance.  6.  Annual physical exam  He has never had an annual physical exam or STI testing. Additional labs were ordered, including STI testing.      History of Present Illness  The patient presents for evaluation of multiple medical concerns.    He has never undergone an annual physical exam. He admits to excessive caffeine intake but reports no palpitations, shortness of breath, or chest pain. He does not consume alcohol. He is trying to quit smoking and switched to vaping 1.5 years ago. He used a nicotine patch a few months ago to quit vaping but is still struggling. He has tried nicotine gum and patches, which helped him quit smoking for a few days. He smokes once every 2 weeks. He experienced unusual dreams and nightmares while using the nicotine patch. He has never been tested for STIs. He used to receive annual influenza vaccines but has not done so recently due to adverse reactions.    He is currently on worker's compensation for a back injury and is taking methocarbamol 500 mg and meloxicam for pain management. He has a physical therapy session for his back scheduled for tomorrow.    He underwent left knee surgery in 2010 and upper jaw surgery in 2008. He is in the process of getting dentures due to neglect of his teeth post-surgery. He recently visited a dentist and was informed that all his teeth need to be extracted. He does not have difficulty swallowing and consumes soft foods.    He suffers from severe epigastric pain due to acid reflux, which disrupts his sleep. Over-the-counter medications like Tums and Pepto-Bismol were initially effective but are no longer helpful. Omeprazole was  effective during the first cycle but less so during the second and third cycles.    Three months ago, he experienced a sudden physical change while lying in bed, which he feared was a heart attack. His hands became clammy and sweaty, and his heart was racing. He was taken to the hospital where blood work and an EKG were performed, both of which were normal. He was told it might be an anxiety attack. A month later, he experienced the same symptoms and was again taken to the hospital. He was referred to a cardiologist but has not yet seen one. Since then, he has had multiple similar episodes. He experiences tingling in his feet and hands and excessive sweating from his hands during these episodes.     He is trying to improve his diet, which often includes processed and unhealthy foods due to long workdays. He does not take aspirin regularly, although he used to take it frequently when he was younger and working at [employer] due to body aches and dental issues. He reports no anxiety, depression, or insomnia.    SOCIAL HISTORY  He is a smoker and is trying to quit.    FAMILY HISTORY  His paternal grandfather  of a heart attack at a young age. Everybody else in his family lives to be 100 even though they are heavy smokers. He thinks his family probably has bad addictive habits.       Health Maintenance  Below Anticipatory guidance discussed with patient  Cholesterol Screening: labs  Diabetes Screening: labs  Aspirin Use: no    Diet: regular   Exercise: active   Substance Abuse: smoking   Safe in relationship.   Seat belts, bike helmet, gun safety discussed.  Sun protection used.    Infectious disease screening/Immunizations  --STI Screening:   --Practices safe sex.  --HIV Screening: labs   --Hepatitis C Screening: labs   --Immunizations:      Review of Systems   Constitutional:  Negative for chills, fever, malaise/fatigue and weight loss.   HENT:  Negative for congestion, ear discharge, hearing loss, sinus pain and sore  throat.    Eyes:  Negative for pain, discharge and redness.   Respiratory:  Negative for cough, sputum production, shortness of breath and wheezing.    Cardiovascular:  Negative for chest pain, palpitations and leg swelling.   Gastrointestinal:  Positive for abdominal pain. Negative for blood in stool, constipation, diarrhea, nausea and vomiting.        Epigastric discomfort   Genitourinary:  Negative for dysuria, flank pain, frequency, hematuria and urgency.   Musculoskeletal:  Negative for back pain, joint pain and myalgias.   Skin:  Negative for itching and rash.   Neurological:  Negative for dizziness, tremors, sensory change, speech change, focal weakness, loss of consciousness, weakness and headaches.   Endo/Heme/Allergies:  Negative for environmental allergies and polydipsia. Does not bruise/bleed easily.   Psychiatric/Behavioral:  Negative for depression. The patient is not nervous/anxious and does not have insomnia.         He  has no past medical history on file.  He  has a past surgical history that includes arch bar application (09/24/2008); arch bar removal or repair (10/28/2008); closed reduction (09/24/2008); and repair, knee, acl (Left, 2010).  Family History   Problem Relation Age of Onset    Heart Disease Paternal Grandfather      Social History     Tobacco Use    Smoking status: Some Days     Current packs/day: 0.50     Average packs/day: 0.5 packs/day for 6.0 years (3.0 ttl pk-yrs)     Types: Cigarettes    Smokeless tobacco: Never   Vaping Use    Vaping status: Never Used   Substance Use Topics    Alcohol use: Not Currently     Comment: rarely     Drug use: No     Patient Active Problem List    Diagnosis Date Noted    Gastroesophageal reflux disease without esophagitis 12/03/2024    Epigastric pain 12/03/2024    Panic attacks 12/03/2024    Annual physical exam 12/03/2024    History of reconstruction of anterior cruciate ligament tear 02/19/2021    Tobacco user 02/19/2021     Current Outpatient  "Medications   Medication Sig Dispense Refill    nicotine (NICODERM) 7 MG/24HR PATCH 24 HR Place 1 Patch on the skin every 24 hours. 90 Patch 1    pantoprazole (PROTONIX) 20 MG tablet Take 1 Tablet by mouth 2 times a day. 180 Tablet 1    methocarbamol (ROBAXIN) 500 MG Tab Take 1 Tablet by mouth 4 times a day. 30 Tablet 0    meloxicam (MOBIC) 7.5 MG Tab Take 1 Tablet by mouth 1 time a day as needed for Moderate Pain. 15 Tablet 0     No current facility-administered medications for this visit.    (including changes today)  Allergies: Patient has no known allergies.    /68 (BP Location: Left arm, Patient Position: Sitting, BP Cuff Size: Large adult)   Pulse (!) 102   Resp 16   Ht 1.803 m (5' 11\")   Wt 110 kg (242 lb 8.1 oz)   SpO2 98%      Physical Exam  Constitutional:       General: He is not in acute distress.     Appearance: Normal appearance. He is normal weight. He is not ill-appearing.   HENT:      Head: Normocephalic and atraumatic.      Right Ear: Tympanic membrane, ear canal and external ear normal. There is no impacted cerumen.      Left Ear: Tympanic membrane, ear canal and external ear normal. There is no impacted cerumen.      Nose: Nose normal. No congestion or rhinorrhea.      Mouth/Throat:      Mouth: Mucous membranes are moist.      Pharynx: No oropharyngeal exudate or posterior oropharyngeal erythema.   Eyes:      General:         Right eye: No discharge.         Left eye: No discharge.      Pupils: Pupils are equal, round, and reactive to light.   Cardiovascular:      Rate and Rhythm: Normal rate.      Pulses: Normal pulses.      Heart sounds: Normal heart sounds. No murmur heard.     No friction rub. No gallop.   Pulmonary:      Effort: Pulmonary effort is normal. No respiratory distress.      Breath sounds: Normal breath sounds. No wheezing, rhonchi or rales.   Abdominal:      General: Bowel sounds are normal. There is no distension.      Palpations: Abdomen is soft. There is no mass.    "   Tenderness: There is no abdominal tenderness. There is no right CVA tenderness, left CVA tenderness, guarding or rebound.      Hernia: No hernia is present.   Musculoskeletal:         General: No swelling, tenderness or deformity. Normal range of motion.      Cervical back: Normal range of motion and neck supple.      Right lower leg: No edema.      Left lower leg: No edema.   Lymphadenopathy:      Cervical: No cervical adenopathy.   Skin:     General: Skin is warm.      Findings: No rash.   Neurological:      General: No focal deficit present.      Mental Status: He is alert. Mental status is at baseline.      Cranial Nerves: No cranial nerve deficit.      Sensory: No sensory deficit.      Motor: No weakness.      Coordination: Coordination normal.      Gait: Gait normal.   Psychiatric:         Mood and Affect: Mood normal.         Behavior: Behavior normal.          Results         No follow-ups on file. 6-8 wks

## 2024-12-18 NOTE — OP THERAPY DAILY TREATMENT
Outpatient Physical Therapy  DAILY TREATMENT     [facility] Outpatient Physical Therapy  [address]  [place] [address]  Phone:  [phone]  Fax:  [phone]    Date: 12/19/2024    Patient: Rich Brown  YOB: 1990  MRN: [id number]     Time Calculation    Start time: 0730  Stop time: 0825 Time Calculation (min): 55 minutes         Chief Complaint: Back Problem    Visit #: 3    SUBJECTIVE:  Pt states that he has been having some good days and some bad days. Has associated it with     OBJECTIVE:  Current objective measures:           Therapeutic Exercises (CPT 99832):     1. UBE, x6, cardiovascular warm up    2. cat cow, x5    3. open book, x5    4. 3 way seated ball rolls, x30 seconds    5. diaphragmetic breathing, x15 4 seconds in/4 seconds out    6. supine shoulder ER with OTB (doubled up), 2x 10 10 seconds isos    7. LTR, x10    8. active hamstring stretch, x10 seconds x 10    9. ball bridge, x10      Therapeutic Exercise Summary: Pt performed these exercises with instruction and SPV.  Provided handout of these exercises for daily HEP.     Access Code: [email or web address]  URL: [email or web address]  Date: 11/21/2024  Prepared by: Sera Hernandez    Exercises  - Sidelying Open Book Thoracic Lumbar Rotation and Extension  - 1 x daily - 7 x weekly - 3 sets - 10 reps - 5 seconds hold  - Cat Cow  - 1 x daily - 7 x weekly - 3 sets - 10 reps - 5 seconds hold  - Standing 'L' Stretch at Counter  - 1 x daily - 7 x weekly - 3 sets - 30 seconds - 1 minutes hold    Therapeutic Treatments and Modalities:     1. E Stim Unattended (CPT 19599), t/s with mhp x10 mins    2. Manual Therapy (CPT 50332),  to t/s spine, rotatoinal mobs to mid thoracic    Time-based treatments/modalities:    Physical Therapy Timed Treatment Charges  Manual therapy minutes (CPT 61510): 13 minutes  Therapeutic exercise minutes (CPT 70088): 25 minutes    ASSESSMENT:   Response to treatment:  Pt tolerated today's treatment session fairly. Pt with increased tension at t/s paraspinals today with hypersensitivity to light pressure. Pt also with newer tightness in the R lumbar paraspinals and QL. Gr II  were utilized at t/s today to increase extension and address pain. Gentle periscapular strengthening and diaphragmatic breathing were tolerated well. Addition of l/s rotation stretching and hamstring stretching were also tolerated well. Education was provided on best practice for increasing cardiovascular endurance without increasing fatigue.     PLAN/RECOMMENDATIONS:   Plan for treatment: therapy treatment to continue next visit.  Planned interventions for next visit: continue with current treatment.

## 2024-12-19 ENCOUNTER — PHYSICAL THERAPY (OUTPATIENT)
Dept: PHYSICAL THERAPY | Facility: MEDICAL CENTER | Age: 34
End: 2024-12-19
Attending: FAMILY MEDICINE
Payer: COMMERCIAL

## 2024-12-19 DIAGNOSIS — S29.019D THORACIC MYOFASCIAL STRAIN, SUBSEQUENT ENCOUNTER: ICD-10-CM

## 2024-12-19 PROCEDURE — 97014 ELECTRIC STIMULATION THERAPY: CPT

## 2024-12-19 PROCEDURE — 97140 MANUAL THERAPY 1/> REGIONS: CPT

## 2024-12-19 PROCEDURE — 97110 THERAPEUTIC EXERCISES: CPT

## 2024-12-24 NOTE — OP THERAPY DAILY TREATMENT
Outpatient Physical Therapy  DAILY TREATMENT     [facility] Outpatient Physical Therapy  [address]  [place] [address]  Phone:  [phone]  Fax:  [phone]    Date: 12/26/2024    Patient: Rich Brown  YOB: 1990  MRN: [id number]     Time Calculation                   Chief Complaint: No chief complaint on file.    Visit #: 4    SUBJECTIVE:  Pt states    OBJECTIVE:  Current objective measures:           Therapeutic Exercises (CPT 37276):     1. UBE, x6, cardiovascular warm up    2. cat cow, x5    3. open book, x5    4. 3 way seated ball rolls, x30 seconds    5. diaphragmetic breathing, x15 4 seconds in/4 seconds out    6. supine shoulder ER with OTB (doubled up), 2x 10 10 seconds isos    7. LTR, x10    8. active hamstring stretch, x10 seconds x 10    9. ball bridge, x10      Therapeutic Exercise Summary: Pt performed these exercises with instruction and SPV.  Provided handout of these exercises for daily HEP.     Access Code: [email or web address]  URL: [email or web address]  Date: 11/21/2024  Prepared by: Sera Hernandez    Exercises  - Sidelying Open Book Thoracic Lumbar Rotation and Extension  - 1 x daily - 7 x weekly - 3 sets - 10 reps - 5 seconds hold  - Cat Cow  - 1 x daily - 7 x weekly - 3 sets - 10 reps - 5 seconds hold  - Standing 'L' Stretch at Counter  - 1 x daily - 7 x weekly - 3 sets - 30 seconds - 1 minutes hold    Therapeutic Treatments and Modalities:     1. E Stim Unattended (CPT 31302), t/s with mhp x10 mins    2. Manual Therapy (CPT 58001),  to t/s spine, rotatoinal mobs to mid thoracic    Time-based treatments/modalities:           Pain rating (1-10) before treatment:  {PAIN NUMBERS_1-10:99422}  Pain rating (1-10) after treatment:  {PAIN NUMBERS_1-10:24393}    ASSESSMENT:   Response to treatment:     Pt tolerated today's treatment session fairly. Pt with increased tension at t/s paraspinals today with hypersensitivity to light  pressure. Pt also with newer tightness in the R lumbar paraspinals and QL. Gr II  were utilized at t/s today to increase extension and address pain. Gentle periscapular strengthening and diaphragmatic breathing were tolerated well. Addition of l/s rotation stretching and hamstring stretching were also tolerated well. Education was provided on best practice for increasing cardiovascular endurance without increasing fatigue     PLAN/RECOMMENDATIONS:   Plan for treatment: therapy treatment to continue next visit.  Planned interventions for next visit: continue with current treatment.

## 2024-12-26 ENCOUNTER — APPOINTMENT (OUTPATIENT)
Dept: PHYSICAL THERAPY | Facility: MEDICAL CENTER | Age: 34
End: 2024-12-26
Attending: FAMILY MEDICINE
Payer: COMMERCIAL

## 2024-12-31 NOTE — OP THERAPY DAILY TREATMENT
Outpatient Physical Therapy  DAILY TREATMENT     [facility] Outpatient Physical Therapy  [address]  [place] [address]  Phone:  [phone]  Fax:  [phone]    Date: 01/02/2025    Patient: Rich Brown  YOB: 1990  MRN: [id number]     Time Calculation    Start time: 0730  Stop time: 0811 Time Calculation (min): 41 minutes         Chief Complaint: Back Problem    Visit #: 4    SUBJECTIVE:  Pt states that he is having good and bad days still. Was sick for 5-6 days and really was knocked down from this. Had a lot of body aches which did not help his back pain, did not exercise due to fatigue and not feeling well.     OBJECTIVE:  Current objective measures:           Therapeutic Exercises (CPT 30028):     1. TM walking, x6 min, cardiovascular warm up    2. cat cow to perfecto pose, x5    3. open book, x5    4. 3 way seated ball rolls, x30 seconds, NT    5. diaphragmetic breathing, x15 4 seconds in/4 seconds out    6. supine shoulder ER with OTB (doubled up), 2x 10 10 seconds isos    7. LTR, x10    8. active hamstring stretch, x10 seconds x 10    9. ball bridge, 10 x10 secs, NT    10. t/s extension on FR, x10    11. rows, 2x 10 BTB      Therapeutic Exercise Summary: Pt performed these exercises with instruction and SPV.  Provided handout of these exercises for daily HEP.     Access Code: [email or web address]  URL: [email or web address]  Date: 11/21/2024  Prepared by: Sera Hernandez    Exercises  - Sidelying Open Book Thoracic Lumbar Rotation and Extension  - 1 x daily - 7 x weekly - 3 sets - 10 reps - 5 seconds hold  - Cat Cow  - 1 x daily - 7 x weekly - 3 sets - 10 reps - 5 seconds hold  - Standing 'L' Stretch at Counter  - 1 x daily - 7 x weekly - 3 sets - 30 seconds - 1 minutes hold    Therapeutic Treatments and Modalities:     1. E Stim Unattended (CPT 86058), t/s with mhp x10 mins    Time-based treatments/modalities:    Physical Therapy Timed Treatment  Charges  Therapeutic exercise minutes (CPT 87682): 41 minutes      ASSESSMENT:   Response to treatment: Pt tolerated today's treatment session well. Pt with good tolerance to all stretching/strengthening today. Discussed response to sickness and inflammatory response that likely happened at the t/s-l/s paraspinals due to sickness. Also educated pt on collaborative therapeutic options that he may benefit from such as a evaluation with physiatry. Will continue to benefit from progressive stretching and strengthening program as tolerated.     PLAN/RECOMMENDATIONS:   Plan for treatment: therapy treatment to continue next visit.  Planned interventions for next visit: continue with current treatment.

## 2025-01-02 ENCOUNTER — PHYSICAL THERAPY (OUTPATIENT)
Dept: PHYSICAL THERAPY | Facility: MEDICAL CENTER | Age: 35
End: 2025-01-02
Attending: PREVENTIVE MEDICINE
Payer: COMMERCIAL

## 2025-01-02 DIAGNOSIS — S29.019D THORACIC MYOFASCIAL STRAIN, SUBSEQUENT ENCOUNTER: ICD-10-CM

## 2025-01-02 PROCEDURE — 97110 THERAPEUTIC EXERCISES: CPT

## 2025-01-07 ENCOUNTER — OCCUPATIONAL MEDICINE (OUTPATIENT)
Dept: OCCUPATIONAL MEDICINE | Facility: CLINIC | Age: 35
End: 2025-01-07
Payer: COMMERCIAL

## 2025-01-07 VITALS
RESPIRATION RATE: 20 BRPM | HEART RATE: 110 BPM | BODY MASS INDEX: 34.02 KG/M2 | DIASTOLIC BLOOD PRESSURE: 76 MMHG | OXYGEN SATURATION: 100 % | SYSTOLIC BLOOD PRESSURE: 122 MMHG | WEIGHT: 243 LBS | HEIGHT: 71 IN

## 2025-01-07 DIAGNOSIS — S29.019D THORACIC MYOFASCIAL STRAIN, SUBSEQUENT ENCOUNTER: ICD-10-CM

## 2025-01-07 PROCEDURE — 99213 OFFICE O/P EST LOW 20 MIN: CPT | Performed by: PREVENTIVE MEDICINE

## 2025-01-07 NOTE — PROGRESS NOTES
"Subjective:     Rich Brown is a 34 y.o. male who presents for Follow-Up (WC DOI 10/9/24 back, rm 16)    CONCEPCIÓN: He was moving power walls from a broken pallet in a pallet while lifting these he felt sudden pain on in the mid back.  He was seen in urgent care x 3.  Advised NSAIDs, muscle relaxers and lidocaine patches.       10/29/2024: Patient states that overall symptoms are about the same.  He is only had minimal improvement.  Pain is mostly in the mid to lower thoracic spine.  Denies radiating pain, numbness or tingling.  He states the most helpful thing thus far has been the Robaxin that he was prescribed.  He states he takes it twice a day usually for pain relief.  He states that he has been off work due to restrictions.  He denies any other prior back injuries     11/26/2024: Symptoms unchanged.  Continues to have pain in the mid back.  Denies radicular symptoms.  Just started physical therapy couple weeks ago for the initial evaluation, has first treatment scheduled for tomorrow.  He states that some days pain is pretty minimal but other days pain is more moderate.  Taking less of the Robaxin.    1/7/2025: Patient states that he has good improvement with physical therapy.  He states that he has been spent a lot of time on the treadmill and going on walks try to get ready to build to go back to work full duty.  He states that he has a couple days where the pain is pretty minimal but then he will have a few days where the pain is more severe.  But overall feels significantly improved.  Still has 4 sessions of physical therapy remaining.    ROS    SOCHX: Works as a  at [employer]  FH: No pertinent family history to this problem.       Objective:     /76   Pulse (!) 110   Resp 20   Ht 1.803 m (5' 11\")   Wt 110 kg (243 lb)   SpO2 100%   BMI 33.89 kg/m²     Constitutional: Patient is in no acute distress. Appears well-developed and well-nourished.   Cardiovascular: Normal rate.  "   Pulmonary/Chest: Effort normal. No respiratory distress.   Neurological: Patient is alert and oriented to person, place, and time.   Skin: Skin is warm and dry.   Psychiatric: Normal mood and affect. Behavior is normal.     Thoracic: No gross deformity.  Tenderness palpation over the mid to lower thoracic paraspinal musculature and midline.  Range of motion minimally diminished with flexion and rotation due to pain    Assessment/Plan:     1. Thoracic myofascial strain, subsequent encounter  - Referral to Radiology  - MR-THORACIC SPINE-W/O; Future      Overall improvement but given the duration of symptoms refer for MRI thoracic spine without  Continue physical therapy  Continue Robaxin and lidocaine patches as needed  May use OTC Aleve/ibuprofen as needed  Lessen work restrictions      Work Status: Restricted Duty - see D-39 or other state/federal worker's compensation forms for specific restrictions if applicable  Follow up 4 weeks    Differential diagnosis, natural history, supportive care, and indications for immediate follow-up discussed.

## 2025-01-07 NOTE — LETTER
PHYSICIAN’S AND CHIROPRACTIC PHYSICIAN'S   PROGRESS REPORT   CERTIFICATION OF DISABILITY Claim Number:     Social Security Number:    Patient’s Name: Rich Brown Date of Injury: 10/9/2024   Employer: [employer] Name of MCO (if applicable):      Patient’s Job Description/Occupation:        Previous Injuries/Diseases/Surgeries Contributing to the Condition:         Diagnosis: (S29.019D) Thoracic myofascial strain, subsequent encounter      Related to the Industrial Injury? Yes     Explain:        Objective Medical Findings: Thoracic: No gross deformity.  Tenderness palpation over the mid to lower thoracic paraspinal musculature and midline.  Range of motion minimally diminished with flexion and rotation due to pain         None - Discharged                         Stable  No                 Ratable  No     X   Generally Improved                         Condition Worsened                  Condition Same  May Have Suffered a Permanent Disability No     Treatment Plan:    Overall improvement but given the duration of symptoms refer for MRI thoracic spine without  Continue physical therapy  Continue Robaxin and lidocaine patches as needed  May use OTC Aleve/ibuprofen as needed  Lessen work restrictions           No Change in Therapy                  PT/OT Prescribed                      Medication May be Used While Working        Case Management                          PT/OT Discontinued    Consultation    Further Diagnostic Studies:    Prescription(s)                 Released to FULL DUTY /No Restrictions on (Date):       Certified TOTALLY TEMPORARILY DISABLED (Indicate Dates) From:   To:    X  Released to RESTRICTED/Modified Duty on (Date): From: 1/7/2025 To: 2/12/2025  Restrictions Are:  Temporary      No Sitting    No Standing    No Pulling Other: Limit lifting to less than 40 pounds       No Bending at Waist     No Stooping     No Lifting        No Carrying     No Walking Lifting  Restricted to (lbs.):          No Pushing        No Climbing     No Reaching Above Shoulders       Date of Next Visit:  2/12/2025 @ 4:45 pm Date of this Exam: 1/7/2025 Physician/Chiropractic Physician Name: Rene Alonso D.O. Physician/Chiropractic Physician Signature:  Rene Alonso DO MPH     [place]:  [address] [place] [address], [address] [place], [place] [address] - Telephone [phone] [place]:  [address] [place] [address], [address] [place], [place] [address] - Telephone [phone]    [email or web address]  D-39 (Rev. 10/24)

## 2025-01-08 NOTE — OP THERAPY DAILY TREATMENT
Outpatient Physical Therapy  DAILY TREATMENT     [facility] Outpatient Physical Therapy  [address]  [place] [address]  Phone:  [phone]  Fax:  [phone]    Date: 01/09/2025    Patient: Rich Brown  YOB: 1990  MRN: [id number]     Time Calculation    Start time: 0730  Stop time: 0810 Time Calculation (min): 40 minutes         Chief Complaint: Back Problem    Visit #: 5    SUBJECTIVE: Pt states that he did a 3-4 hour deep clean in his apartment which causes some soreness in his back. However notes he has pretty good energy compared to last session. Notes that his updated work restrictions are lifting up to 40 lbs.       OBJECTIVE:  Current objective measures:           Therapeutic Exercises (CPT 11123):     1. TM walking, x6 min, cardiovascular warm up    2. cat cow to perfecto pose, x5    3. open book, x5    4. 3 way seated ball rolls, x10 ea    5. diaphragmetic breathing, x15 4 seconds in/4 seconds out    6. supine shoulder ER with OTB (doubled up), 2x 10 10 seconds isos, NT    7. LTR, x10    8. active hamstring stretch, x10 seconds x 10    9. ball bridge, 10 x10 secs, NT    10. t/s extension on FR, x10, NT    11. rows, 2x 10 BTB, NT      Therapeutic Exercise Summary: Pt performed these exercises with instruction and SPV.  Provided handout of these exercises for daily HEP.     Access Code: [email or web address]  URL: [email or web address]  Date: 11/21/2024  Prepared by: Sera Hernandez    Exercises  - Sidelying Open Book Thoracic Lumbar Rotation and Extension  - 1 x daily - 7 x weekly - 3 sets - 10 reps - 5 seconds hold  - Cat Cow  - 1 x daily - 7 x weekly - 3 sets - 10 reps - 5 seconds hold  - Standing 'L' Stretch at Counter  - 1 x daily - 7 x weekly - 3 sets - 30 seconds - 1 minutes hold    Therapeutic Treatments and Modalities:     1. E Stim Unattended (CPT 11627), t/s with mhp x10 mins    Time-based treatments/modalities:    Physical Therapy Timed  Treatment Charges  Neuromusc re-ed, balance, coor, post minutes (CPT 72961): 10 minutes  Therapeutic exercise minutes (CPT 90384): 30 minutes    ASSESSMENT:   Response to treatment: Pt with increased soreness today during session due to overdoing cleaning in his apartment. Presenting with increased stiffness and mobility restrictions. Gr II  and rotational mobs were utilized today in order to decrease pain, noticeable hypersensitivity today with these compared to prior sessions. Opted for mobility focused there-x today for increased tolerance with movement and reduction of stiffness at t/s.     PLAN/RECOMMENDATIONS:   Plan for treatment: therapy treatment to continue next visit.  Planned interventions for next visit: continue with current treatment.

## 2025-01-09 ENCOUNTER — PHYSICAL THERAPY (OUTPATIENT)
Dept: PHYSICAL THERAPY | Facility: MEDICAL CENTER | Age: 35
End: 2025-01-09
Attending: PREVENTIVE MEDICINE
Payer: COMMERCIAL

## 2025-01-09 DIAGNOSIS — S29.019D THORACIC MYOFASCIAL STRAIN, SUBSEQUENT ENCOUNTER: ICD-10-CM

## 2025-01-09 PROCEDURE — 97112 NEUROMUSCULAR REEDUCATION: CPT

## 2025-01-09 PROCEDURE — 97110 THERAPEUTIC EXERCISES: CPT

## 2025-01-09 PROCEDURE — 97014 ELECTRIC STIMULATION THERAPY: CPT

## 2025-01-16 ENCOUNTER — PHYSICAL THERAPY (OUTPATIENT)
Dept: PHYSICAL THERAPY | Facility: MEDICAL CENTER | Age: 35
End: 2025-01-16
Attending: PREVENTIVE MEDICINE
Payer: COMMERCIAL

## 2025-01-16 DIAGNOSIS — S29.019D THORACIC MYOFASCIAL STRAIN, SUBSEQUENT ENCOUNTER: ICD-10-CM

## 2025-01-16 PROCEDURE — 97110 THERAPEUTIC EXERCISES: CPT

## 2025-01-16 PROCEDURE — 97014 ELECTRIC STIMULATION THERAPY: CPT

## 2025-01-16 PROCEDURE — 97530 THERAPEUTIC ACTIVITIES: CPT

## 2025-01-16 NOTE — OP THERAPY PROGRESS SUMMARY
Outpatient Physical Therapy  PROGRESS SUMMARY NOTE      [facility] Outpatient Physical Therapy  [address]  [place] [address]  Phone:  [phone]  Fax:  [phone]    Date of Visit: 01/16/2025    Patient: Rich Brown  YOB: 1990  MRN: [id number]     Referring Provider: Rene Alonso D.O.  [address] [facility] [address]  [place] [address]   Referring Diagnosis Strain of muscle and tendon of unspecified wall of thorax, subsequent encounter [S29.019D]     Visit Diagnoses     ICD-10-CM   1. Thoracic myofascial strain, subsequent encounter  S29.019D       Rehab Potential: good    Progress Report Period: 11/21/2024-01/16/2025    Functional Assessment Used  Gt Daniel Low Back Pain and Disability Score: 12.5       Objective Findings and Assessment:   Patient progression towards goals: Pt has been seen in skilled physical therapy for 6 visits with this provider. Pt has made good progress towards achieving goals thus far. Pt has shown progress with improved activity tolerance, increased stretches between days with pain, improved t/s and l/s mobility, and overall improved functional mobility. Of note, pt returned back to work on Sunday with two days notice, is working 12 hour shifts with modifications and restrictions being followed appropriately. Pt reports that is has been rough and his second day was painful. Notes that he was pushing a large heavy cart back and forth all night, the second night he was in a piece of equipment which bounces around causing a lot of pain. Pt continues to present with the following limitations: soreness with return to work duties (whole body soreness not just back). Notes that he still has been limited on lifting (30 lb limit as directed by occupational MD). Also notes quick fatigue with return to work duties (pushing heavy cart, standing for 12 hours). Pt continues to present with muscle spasms with manual mobilization  techniques as well as hypersensitivity to light pressure to t/s-l/s paraspinals.     Short Term Goals:   1. Pt will report independence and compliance with written HEP (met)  2. Pt will demo improved t/s and l/s AROM to WFL in order to return to improved mobility to create ease with bending to floor to lift an object (met)  3. Pt will report >/= to 2 hours standing/walking tolerance with no rest breaks in order to return to work type tasks (in progress :1 hour max at the moment)        Long Term Goals:    1. Pt will report independence and compliance with written HEP (met)  2. Pt will report >/= to 3 hours standing/walking tolerance with no rest breaks in order to return to work type tasks (in progress)   3. Pt will demo improved periscapular strength to WFL in order to lift a heavy box to/from floor with good upright mechanics (in progress)  4. Pt will demo floor to table lift of >/= to 50lbs in order to return to work duties with confidence and ease (not met)  5. Pt will have a significant improvement in RMQ with MCID of >/= to 5 points (eval:  37.5; met, 12.5 progress note)        Objective findings and assessment details: Observations   Central spine    Positive for forward head/neck, rounded shoulders and thoracic kyphosis.     Postural Observations  Seated posture: fair  Standing posture: fair      Shoulder Screen    Shoulder active range of motion within functional limits.  Shoulder range of motion within functional limits with the following exceptions: Limited ER BTH due to pain at R thoracic spine     Palpation   Left   Hypertonic in the thoracic paraspinals.   Tenderness of the rhomboids and thoracic paraspinals.      Right   Hypertonic in the thoracic paraspinals.   Tenderness of the middle trapezius, rhomboids and thoracic paraspinals.      Active Range of Motion      Cervical Spine   Flexion: within functional limits  Extension: within functional limits  Retraction: within functional limits  Left  lateral flexion: decreased  Right lateral flexion: decreased  Left rotation: within functional limits  Right rotation: within functional limits     Lumbar   Flexion: within functional limits  Extension: decreased (pain)  Left lateral flexion: within functional limits  Right lateral flexion: within functional limits  Left rotation: decreased (pain)  Right rotation: decreased     Additional Active Range of Motion Details  T/S rotation limited by 2/3 ROM bilaterally, L rotation worse than R, unable to assess PROM due to pain with overpressure in sitting      Joint Play   Spine     Central PA [place]        T1: WFL with grade 2       T2: WFL with grade 2       T3: hypomobile with grade 2       T4: hypomobile and painful with grade 2       T5: hypomobile and painful with grade 2       T6: hypomobile and painful with grade 2       T7: hypomobile and painful with grade 2       T8: hypomobile and painful with grade 2      Strength:       Upper extremities   Left upper extremity strength within functional limits  Right upper extremity strength within functional limits    Pt will continue to benefit from skilled PT services in order to address the remaining limitations. PT educated on importance of utilize next few sessions to address lifting mechanics and pushing mechanics as pt has returned to work to reduce risk of re-injury.     Goals:   Short Term Goals:   Pt will report >/= to 2 hours standing/walking tolerance with no rest breaks in order to return to work type tasks  Short term goal time span:  1-2 weeks      Long Term Goals:    1.Pt will report >/= to 3 hours standing/walking tolerance with no rest breaks in order to return to work type tasks   2. Pt will demo improved periscapular strength to WFL in order to lift a heavy box to/from floor with good upright mechanics   3. Pt will demo floor to table lift of >/= to 50lbs in order to return to work duties with confidence and ease  4. Pt will have a significant  improvement in RMQ with MCID of >/= to 5 points (eval:  12.5)   Long term goal time span:  2-4 weeks    Plan:   Planned therapy interventions:  Neuromuscular Re-education (CPT 13300), Manual Therapy (CPT 38571), Therapeutic Exercise (CPT 43115), Therapeutic Activities (CPT 79058) and E Stim Unattended (CPT 21700)  Frequency:  1x week  Duration in weeks:  4  Plan details:  UPOC: 02/14/2025      Referring provider co-signature:  I have reviewed this plan of care and my co-signature certifies the need for services.     Certification Period: 01/16/2025 to 02/14/2025    Physician Signature: ________________________________ Date: ______________

## 2025-01-22 NOTE — OP THERAPY DAILY TREATMENT
Outpatient Physical Therapy  DAILY TREATMENT     [facility] Outpatient Physical Therapy  [address]  [place] [address]  Phone:  [phone]  Fax:  [phone]    Date: 01/23/2025    Patient: Rich Brown  YOB: 1990  MRN: [id number]     Time Calculation    Start time: 0930  Stop time: 1012 Time Calculation (min): 42 minutes         Chief Complaint: Back Problem and Work-Related Injury    Visit #: 7    SUBJECTIVE:  Pt states that he had a better week at work, was sore and a little painful after one day but did lighter duty this week. Notes that he tried to use the TENS unit during work but he sweats a lot and it fell right off. Did not that stretching during and after work felt beneficial and feels his mobility is improving again.     OBJECTIVE:  Current objective measures:           Therapeutic Exercises (CPT 95710):     1. TM walking, x6 min, cardiovascular warm up    2. cat cow to perfecto pose, x5    3. open book, x5    4. 3 way seated ball rolls, x10 ea    5. diaphragmetic breathing, x15 4 seconds in/4 seconds out    6. supine shoulder ER with OTB (doubled up), 2x 10 10 seconds isos, NT    7. LTR, x10    8. active hamstring stretch, x10 seconds x 10    9. ball bridge, 10 x10 secs, NT    10. t/s extension on FR, x10, NT    11. rows, 2x 10 BTB, NT    13. lifting mechanics, x10 38lbs (box)      Therapeutic Exercise Summary: Pt performed these exercises with instruction and SPV.  Provided handout of these exercises for daily HEP.     Access Code: [email or web address]  URL: [email or web address]  Date: 11/21/2024  Prepared by: Sera Hernandez    Exercises  - Sidelying Open Book Thoracic Lumbar Rotation and Extension  - 1 x daily - 7 x weekly - 3 sets - 10 reps - 5 seconds hold  - Cat Cow  - 1 x daily - 7 x weekly - 3 sets - 10 reps - 5 seconds hold  - Standing 'L' Stretch at Counter  - 1 x daily - 7 x weekly - 3 sets - 30 seconds - 1 minutes hold    Therapeutic  Treatments and Modalities:     1. E Stim Unattended (CPT 63308), t/s with mhp x10 mins    2. Manual Therapy (CPT 60267), CPA's to t/s gr II, rotational mobilizaitons gr II in t/s    3. Therapeutic Activities (CPT 92304), progress note, reassessing goals and current limiations    Time-based treatments/modalities:    Physical Therapy Timed Treatment Charges  Therapeutic exercise minutes (CPT 43658): 42 minutes    ASSESSMENT:   Response to treatment: Pt tolerated today's treatment session well however noted slight increase in s/s following lifting 38 lb box to/from floor. Lifting mechanics were assessed and corrections were made today to allow for proper loading. Pt tends to lift with increase thoracic kyphosis. Educated on recruiting periscapular musculature to allow for neutral posture to reduce improper loading. Also educated on TA recruitment to allow for spinal stability during loaded lift.     PLAN/RECOMMENDATIONS:   Plan for treatment: therapy treatment to continue next visit.  Planned interventions for next visit: continue with current treatment.

## 2025-01-23 ENCOUNTER — PHYSICAL THERAPY (OUTPATIENT)
Dept: PHYSICAL THERAPY | Facility: MEDICAL CENTER | Age: 35
End: 2025-01-23
Attending: FAMILY MEDICINE
Payer: COMMERCIAL

## 2025-01-23 DIAGNOSIS — S29.019D THORACIC MYOFASCIAL STRAIN, SUBSEQUENT ENCOUNTER: ICD-10-CM

## 2025-01-23 PROCEDURE — 97110 THERAPEUTIC EXERCISES: CPT

## 2025-01-28 ENCOUNTER — APPOINTMENT (OUTPATIENT)
Dept: MEDICAL GROUP | Facility: MEDICAL CENTER | Age: 35
End: 2025-01-28
Payer: COMMERCIAL

## 2025-01-30 ENCOUNTER — APPOINTMENT (OUTPATIENT)
Dept: PHYSICAL THERAPY | Facility: MEDICAL CENTER | Age: 35
End: 2025-01-30
Attending: PREVENTIVE MEDICINE
Payer: COMMERCIAL

## 2025-02-12 ENCOUNTER — OCCUPATIONAL MEDICINE (OUTPATIENT)
Dept: OCCUPATIONAL MEDICINE | Facility: CLINIC | Age: 35
End: 2025-02-12
Payer: COMMERCIAL

## 2025-02-12 DIAGNOSIS — S29.019D THORACIC MYOFASCIAL STRAIN, SUBSEQUENT ENCOUNTER: ICD-10-CM

## 2025-02-12 PROCEDURE — 99213 OFFICE O/P EST LOW 20 MIN: CPT | Performed by: PREVENTIVE MEDICINE

## 2025-02-12 NOTE — LETTER
PHYSICIAN’S AND CHIROPRACTIC PHYSICIAN'S   PROGRESS REPORT   CERTIFICATION OF DISABILITY Claim Number:     Social Security Number:    Patient’s Name: Rich Brown Date of Injury: 10/9/2024   Employer: [employer] Name of MCO (if applicable):      Patient’s Job Description/Occupation:        Previous Injuries/Diseases/Surgeries Contributing to the Condition:         Diagnosis: (S29.019D) Thoracic myofascial strain, subsequent encounter      Related to the Industrial Injury? Yes     Explain:        Objective Medical Findings: Thoracic: No gross deformity.  Area of pain over the mid to lower thoracic paraspinal musculature and midline.           None - Discharged                         Stable  No                 Ratable  No        Generally Improved                         Condition Worsened               X   Condition Same  May Have Suffered a Permanent Disability No     Treatment Plan:    MRI approved through [facility] but has not been scheduled yet  Continue physical therapy, placed referral for more visits  Continue Robaxin and lidocaine patches as needed  May use OTC Aleve/ibuprofen as needed  Will continue the same work restrictions           No Change in Therapy                  PT/OT Prescribed                      Medication May be Used While Working        Case Management                          PT/OT Discontinued    Consultation    Further Diagnostic Studies:    Prescription(s)                 Released to FULL DUTY /No Restrictions on (Date):       Certified TOTALLY TEMPORARILY DISABLED (Indicate Dates) From:   To:    X  Released to RESTRICTED/Modified Duty on (Date): From: 2/12/2025 To: 3/12/2025  Restrictions Are:  Temporary      No Sitting    No Standing    No Pulling Other: Limit lifting to less than 40 pounds       No Bending at Waist     No Stooping     No Lifting        No Carrying     No Walking Lifting Restricted to (lbs.):          No Pushing        No Climbing     No  Reaching Above Shoulders       Date of Next Visit:  Wednesday  3/12/2025  @ 5:00 PM Date of this Exam: 2/12/2025 Physician/Chiropractic Physician Name: Rene Alonso D.O. Physician/Chiropractic Physician Signature:  Rene Alonso DO MPH     [place]:  [facility]  [facility], [address] [place], [place] [address] - Telephone [phone] [place]:  [address] [place] [address], [address] [place], [place] [address] - Telephone [phone]    [email or web address]  D-39 (Rev. 10/24)

## 2025-02-13 NOTE — PROGRESS NOTES
Subjective:     Rich Brown is a 34 y.o. male who presents for Other    CONCEPCIÓN: He was moving power walls from a broken pallet in a pallet while lifting these he felt sudden pain on in the mid back.  He was seen in urgent care x 3.  Advised NSAIDs, muscle relaxers and lidocaine patches.       10/29/2024: Patient states that overall symptoms are about the same.  He is only had minimal improvement.  Pain is mostly in the mid to lower thoracic spine.  Denies radiating pain, numbness or tingling.  He states the most helpful thing thus far has been the Robaxin that he was prescribed.  He states he takes it twice a day usually for pain relief.  He states that he has been off work due to restrictions.  He denies any other prior back injuries     11/26/2024: Symptoms unchanged.  Continues to have pain in the mid back.  Denies radicular symptoms.  Just started physical therapy couple weeks ago for the initial evaluation, has first treatment scheduled for tomorrow.  He states that some days pain is pretty minimal but other days pain is more moderate.  Taking less of the Robaxin.     1/7/2025: Patient states that he has good improvement with physical therapy.  He states that he has been spent a lot of time on the treadmill and going on walks try to get ready to build to go back to work full duty.  He states that he has a couple days where the pain is pretty minimal but then he will have a few days where the pain is more severe.  But overall feels significantly improved.  Still has 4 sessions of physical therapy remaining.    2/12/2025: Patient states that overall symptoms are about the same.  He states that he has some good days and some bad days.  He states that he had a couple days with minimal pain and then had a 1 day with constant pain throughout the entire day.  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  He states he has 1 session of physical therapy left he  states is difficult to get his visits scheduled for times when he was not at work.  He states that he was not aware that the MRI had been approved.    ROS    SOCHX: Works as a  at [employer]  FH: No pertinent family history to this problem.       Objective:     There were no vitals taken for this visit.    Constitutional: Patient is in no acute distress. Appears well-developed and well-nourished.   Cardiovascular: Normal rate.    Pulmonary/Chest: Effort normal. No respiratory distress.   Neurological: Patient is alert and oriented to person, place, and time.   Skin: Skin is warm and dry.   Psychiatric: Normal mood and affect. Behavior is normal.     Thoracic: No gross deformity.  Area of pain over the mid to lower thoracic paraspinal musculature and midline.      Assessment/Plan:     1. Thoracic myofascial strain, subsequent encounter  - Referral to Physical Therapy      MRI approved through [facility] but has not been scheduled yet  Continue physical therapy, placed referral for more visits  Continue Robaxin and lidocaine patches as needed  May use OTC Aleve/ibuprofen as needed  Will continue the same work restrictions      Work Status: Restricted Duty - see D-39 or other state/federal worker's compensation forms for specific restrictions if applicable  Follow up 4 weeks    Differential diagnosis, natural history, supportive care, and indications for immediate follow-up discussed.

## 2025-03-12 ENCOUNTER — OCCUPATIONAL MEDICINE (OUTPATIENT)
Dept: OCCUPATIONAL MEDICINE | Facility: CLINIC | Age: 35
End: 2025-03-12
Payer: COMMERCIAL

## 2025-03-12 VITALS
DIASTOLIC BLOOD PRESSURE: 74 MMHG | WEIGHT: 239 LBS | SYSTOLIC BLOOD PRESSURE: 122 MMHG | TEMPERATURE: 97.3 F | BODY MASS INDEX: 33.46 KG/M2 | HEART RATE: 97 BPM | HEIGHT: 71 IN | RESPIRATION RATE: 16 BRPM | OXYGEN SATURATION: 98 %

## 2025-03-12 DIAGNOSIS — S29.019D THORACIC MYOFASCIAL STRAIN, SUBSEQUENT ENCOUNTER: ICD-10-CM

## 2025-03-12 PROCEDURE — 99213 OFFICE O/P EST LOW 20 MIN: CPT | Performed by: PREVENTIVE MEDICINE

## 2025-03-12 PROCEDURE — 3074F SYST BP LT 130 MM HG: CPT | Performed by: PREVENTIVE MEDICINE

## 2025-03-12 PROCEDURE — 1125F AMNT PAIN NOTED PAIN PRSNT: CPT | Performed by: PREVENTIVE MEDICINE

## 2025-03-12 PROCEDURE — 3078F DIAST BP <80 MM HG: CPT | Performed by: PREVENTIVE MEDICINE

## 2025-03-12 RX ORDER — LIDOCAINE 50 MG/G
1 PATCH TOPICAL EVERY 24 HOURS
Qty: 30 PATCH | Refills: 0 | Status: SHIPPED | OUTPATIENT
Start: 2025-03-12

## 2025-03-12 ASSESSMENT — PAIN SCALES - GENERAL: PAINLEVEL_OUTOF10: 4=SLIGHT-MODERATE PAIN

## 2025-03-12 NOTE — LETTER
PHYSICIAN’S AND CHIROPRACTIC PHYSICIAN'S   PROGRESS REPORT   CERTIFICATION OF DISABILITY Claim Number:     Social Security Number:    Patient’s Name: Rich Brown Date of Injury: 10/9/2024   Employer: [employer] Name of MCO (if applicable):      Patient’s Job Description/Occupation:        Previous Injuries/Diseases/Surgeries Contributing to the Condition:         Diagnosis: (S29.019D) Thoracic myofascial strain, subsequent encounter      Related to the Industrial Injury? Yes     Explain:        Objective Medical Findings:   Thoracic: No gross deformity.  Area of pain over the mid to lower thoracic paraspinal musculature and midlin         None - Discharged                         Stable  No                 Ratable  No        Generally Improved                         Condition Worsened               X   Condition Same  May Have Suffered a Permanent Disability No     Treatment Plan:    MRI approved through [facility] but has not been scheduled yet, was told they did not have the order  Continue physical therapy, placed referral for more visits, pending scheduling  Continue Robaxin and lidocaine patches as needed  May use OTC Aleve/ibuprofen as needed  Will continue the same work restrictions           No Change in Therapy                  PT/OT Prescribed                      Medication May be Used While Working        Case Management                          PT/OT Discontinued    Consultation    Further Diagnostic Studies:    Prescription(s)                 Released to FULL DUTY /No Restrictions on (Date):       Certified TOTALLY TEMPORARILY DISABLED (Indicate Dates) From:   To:    X  Released to RESTRICTED/Modified Duty on (Date): From: 3/12/2025 To: 4/9/2025  Restrictions Are:  Temporary      No Sitting    No Standing    No Pulling Other: Limit lifting to less than 40 pounds       No Bending at Waist     No Stooping     No Lifting        No Carrying     No Walking Lifting Restricted to  (lbs.):          No Pushing        No Climbing     No Reaching Above Shoulders       Date of Next Visit:  4/9/2025 at 4:30 PM Date of this Exam: 3/12/2025 Physician/Chiropractic Physician Name: Rene Alonso D.O. Physician/Chiropractic Physician Signature:  Rene Alonso DO MPH     [place]:  [address] [place] [address], [address] [place], [place] [address] - Telephone [phone] [place]:  [address] [place] [address], [address] [place], [place] [address] - Telephone [phone]    [email or web address]  D-39 (Rev. 10/24)

## 2025-03-13 NOTE — PROGRESS NOTES
Subjective:     Rich Brown is a 34 y.o. male who presents for Other (WC DOI 10/9/24 BACK-PAIN 4- EX 16)    CONCEPCIÓN: He was moving power walls from a broken pallet in a pallet while lifting these he felt sudden pain on in the mid back.  He was seen in urgent care x 3.  Advised NSAIDs, muscle relaxers and lidocaine patches.       10/29/2024: Patient states that overall symptoms are about the same.  He is only had minimal improvement.  Pain is mostly in the mid to lower thoracic spine.  Denies radiating pain, numbness or tingling.  He states the most helpful thing thus far has been the Robaxin that he was prescribed.  He states he takes it twice a day usually for pain relief.  He states that he has been off work due to restrictions.  He denies any other prior back injuries     11/26/2024: Symptoms unchanged.  Continues to have pain in the mid back.  Denies radicular symptoms.  Just started physical therapy couple weeks ago for the initial evaluation, has first treatment scheduled for tomorrow.  He states that some days pain is pretty minimal but other days pain is more moderate.  Taking less of the Robaxin.     1/7/2025: Patient states that he has good improvement with physical therapy.  He states that he has been spent a lot of time on the treadmill and going on walks try to get ready to build to go back to work full duty.  He states that he has a couple days where the pain is pretty minimal but then he will have a few days where the pain is more severe.  But overall feels significantly improved.  Still has 4 sessions of physical therapy remaining.     2/12/2025: Patient states that overall symptoms are about the same.  He states that he has some good days and some bad days.  He states that he had a couple days with minimal pain and then had a 1 day with constant pain throughout the entire day.  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  He states he has 1  "session of physical therapy left he states is difficult to get his visits scheduled for times when he was not at work.  He states that he was not aware that the MRI had been approved.        ROS    SOCHX: Works as a  at [employer]  FH: No pertinent family history to this problem.       Objective:     /74 (BP Location: Right arm, Patient Position: Sitting, BP Cuff Size: Adult)   Pulse 97   Temp 36.3 °C (97.3 °F) (Temporal)   Resp 16   Ht 1.803 m (5' 11\")   Wt 108 kg (239 lb)   SpO2 98%   BMI 33.33 kg/m²     Constitutional: Patient is in no acute distress. Appears well-developed and well-nourished.   Cardiovascular: Normal rate.    Pulmonary/Chest: Effort normal. No respiratory distress.   Neurological: Patient is alert and oriented to person, place, and time.   Skin: Skin is warm and dry.   Psychiatric: Normal mood and affect. Behavior is normal.       Thoracic: No gross deformity.  Area of pain over the mid to lower thoracic paraspinal musculature and midlin    Assessment/Plan:     1. Thoracic myofascial strain, subsequent encounter  - lidocaine (LIDODERM) 5 % Patch; Place 1 Patch on the skin every 24 hours.  Dispense: 30 Patch; Refill: 0      MRI approved through [facility] but has not been scheduled yet, was told they did not have the order  Continue physical therapy, placed referral for more visits, pending scheduling  Continue Robaxin and lidocaine patches as needed  May use OTC Aleve/ibuprofen as needed  Will continue the same work restrictions      Work Status: Restricted Duty - see D-39 or other state/federal worker's compensation forms for specific restrictions if applicable  Follow up 4 weeks    Differential diagnosis, natural history, supportive care, and indications for immediate follow-up discussed.      "

## 2025-04-02 ENCOUNTER — APPOINTMENT (OUTPATIENT)
Dept: MEDICAL GROUP | Facility: MEDICAL CENTER | Age: 35
End: 2025-04-02
Payer: COMMERCIAL

## 2025-04-16 ENCOUNTER — TELEPHONE (OUTPATIENT)
Dept: OCCUPATIONAL MEDICINE | Facility: CLINIC | Age: 35
End: 2025-04-16
Payer: COMMERCIAL

## 2025-04-17 ENCOUNTER — OCCUPATIONAL MEDICINE (OUTPATIENT)
Dept: OCCUPATIONAL MEDICINE | Facility: CLINIC | Age: 35
End: 2025-04-17
Payer: COMMERCIAL

## 2025-04-17 VITALS
SYSTOLIC BLOOD PRESSURE: 126 MMHG | BODY MASS INDEX: 32.62 KG/M2 | HEIGHT: 71 IN | TEMPERATURE: 97.3 F | OXYGEN SATURATION: 98 % | HEART RATE: 74 BPM | DIASTOLIC BLOOD PRESSURE: 78 MMHG | WEIGHT: 233 LBS | RESPIRATION RATE: 20 BRPM

## 2025-04-17 DIAGNOSIS — S29.019D THORACIC MYOFASCIAL STRAIN, SUBSEQUENT ENCOUNTER: ICD-10-CM

## 2025-04-17 PROCEDURE — 99213 OFFICE O/P EST LOW 20 MIN: CPT | Performed by: PREVENTIVE MEDICINE

## 2025-04-17 RX ORDER — LIDOCAINE 50 MG/G
1 PATCH TOPICAL EVERY 24 HOURS
Qty: 30 PATCH | Refills: 0 | Status: SHIPPED | OUTPATIENT
Start: 2025-04-17

## 2025-04-17 ASSESSMENT — PAIN SCALES - GENERAL: PAINLEVEL_OUTOF10: 1=MINIMAL PAIN

## 2025-04-17 NOTE — LETTER
PHYSICIAN’S AND CHIROPRACTIC PHYSICIAN'S   PROGRESS REPORT   CERTIFICATION OF DISABILITY Claim Number:     Social Security Number:    Patient’s Name: Rich Brown Date of Injury: 10/9/2024   Employer: [employer] Name of MCO (if applicable):      Patient’s Job Description/Occupation:        Previous Injuries/Diseases/Surgeries Contributing to the Condition:         Diagnosis: (S29.019D) Thoracic myofascial strain, subsequent encounter      Related to the Industrial Injury? Yes     Explain:        Objective Medical Findings: Thoracic: No gross deformity.  Area of pain over the mid to lower thoracic paraspinal musculature and midline         None - Discharged                         Stable  No                 Ratable  No        Generally Improved                         Condition Worsened               X   Condition Same  May Have Suffered a Permanent Disability No     Treatment Plan:    Referral to physiatry, pending  MRI Thoracic w/o, scheduled for April 30th,   Continue lidocaine patches as needed, sent refill May use OTC Aleve/ibuprofen as needed  Will continue the same work restrictions           No Change in Therapy                  PT/OT Prescribed                      Medication May be Used While Working        Case Management                          PT/OT Discontinued    Consultation    Further Diagnostic Studies:    Prescription(s)                 Released to FULL DUTY /No Restrictions on (Date):       Certified TOTALLY TEMPORARILY DISABLED (Indicate Dates) From:   To:    X  Released to RESTRICTED/Modified Duty on (Date): From: 4/17/2025 To: 5/6/2025  Restrictions Are:  Temporary      No Sitting    No Standing    No Pulling Other: Limit lifting to less than 40 pounds       No Bending at Waist     No Stooping     No Lifting        No Carrying     No Walking Lifting Restricted to (lbs.):          No Pushing        No Climbing     No Reaching Above Shoulders       Date of Next  Visit:  5/6/2025 at 4:15 pm Date of this Exam: 4/17/2025 Physician/Chiropractic Physician Name: Rene Alonso D.O. Physician/Chiropractic Physician Signature:  Rene Alonso DO MPH     [place]:  [address] [place] [address], [address] [place], [place] [address] - Telephone [phone] [place]:  [address] [place] [address], [address] [place], [place] [address] - Telephone [phone]    [email or web address]  D-39 (Rev. 10/24)

## 2025-04-17 NOTE — PROGRESS NOTES
Subjective:     Rcih Brown is a 34 y.o. male who presents for No chief complaint on file.    CONCEPCIÓN: He was moving power walls from a broken pallet in a pallet while lifting these he felt sudden pain on in the mid back.  He was seen in urgent care x 3.  Advised NSAIDs, muscle relaxers and lidocaine patches.       10/29/2024: Patient states that overall symptoms are about the same.  He is only had minimal improvement.  Pain is mostly in the mid to lower thoracic spine.  Denies radiating pain, numbness or tingling.  He states the most helpful thing thus far has been the Robaxin that he was prescribed.  He states he takes it twice a day usually for pain relief.  He states that he has been off work due to restrictions.  He denies any other prior back injuries     11/26/2024: Symptoms unchanged.  Continues to have pain in the mid back.  Denies radicular symptoms.  Just started physical therapy couple weeks ago for the initial evaluation, has first treatment scheduled for tomorrow.  He states that some days pain is pretty minimal but other days pain is more moderate.  Taking less of the Robaxin.     1/7/2025: Patient states that he has good improvement with physical therapy.  He states that he has been spent a lot of time on the treadmill and going on walks try to get ready to build to go back to work full duty.  He states that he has a couple days where the pain is pretty minimal but then he will have a few days where the pain is more severe.  But overall feels significantly improved.  Still has 4 sessions of physical therapy remaining.     2/12/2025: Patient states that overall symptoms are about the same.  He states that he has some good days and some bad days.  He states that he had a couple days with minimal pain and then had a 1 day with constant pain throughout the entire day.  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  He states he has 1 session of  physical therapy left he states is difficult to get his visits scheduled for times when he was not at work.  He states that he was not aware that the MRI had been approved.    4/17/2025: Patient notes good improvement with lidocaine patches.  He states they helped significantly.  He states that he is able to go through a whole work shift without much pain at all with the patch.  He states he was finally able to get the MRI scheduled and is scheduled for April 30.    ROS    SOCHX: Works as a  at "[employer]  FH: No pertinent family history to this problem.       Objective:     There were no vitals taken for this visit.    Constitutional: Patient is in no acute distress. Appears well-developed and well-nourished.   Cardiovascular: Normal rate.    Pulmonary/Chest: Effort normal. No respiratory distress.   Neurological: Patient is alert and oriented to person, place, and time.   Skin: Skin is warm and dry.   Psychiatric: Normal mood and affect. Behavior is normal.     Thoracic: No gross deformity.  Area of pain over the mid to lower thoracic paraspinal musculature and midline    Assessment/Plan:     1. Thoracic myofascial strain, subsequent encounter  - Referral to Pain Management  - lidocaine (LIDODERM) 5 % Patch; Place 1 Patch on the skin every 24 hours.  Dispense: 30 Patch; Refill: 0      Referral to physiatry, pending  MRI Thoracic w/o, scheduled for April 30th,   Continue lidocaine patches as needed, sent refill May use OTC Aleve/ibuprofen as needed  Will continue the same work restrictions      Work Status: Restricted Duty - see D-39 or other state/federal worker's compensation forms for specific restrictions if applicable  Follow up 3 weeks    Differential diagnosis, natural history, supportive care, and indications for immediate follow-up discussed.

## 2025-04-28 NOTE — Clinical Note
REFERRAL APPROVAL NOTICE         Sent on April 28, 2025                   Rich Brown  [address]   [address]  [place] [address]                   Dear Mr. Brown,    After a careful review of the medical information and benefit coverage, [facility] has processed your referral. See below for additional details.    If applicable, you must be actively enrolled with your insurance for coverage of the authorized service. If you have any questions regarding your coverage, please contact your insurance directly.    REFERRAL INFORMATION   Referral #:  [id number]  Referred-To Provider    Referred-By Provider:  [facility] Med [facility] Rehab    Rene Alonso D.O.   [facility]      [address]  [address]  [place] [address]  [phone] [address] [facility]  [address]  [place] [address]  [phone]    Referral Start Date:  04/17/2025  Referral End Date:   04/17/2026             SCHEDULING  If you do not already have an appointment, please call [phone] to make an appointment.     MORE INFORMATION  If you do not already have a [facility] account, sign up at: [email or web address]  You can access your medical information, make appointments, see lab results, billing information, and more.  If you have questions regarding this referral, please contact  the [facility] Referrals department at:             [phone]. Monday - Friday 8:00AM - 5:00PM.     Sincerely,    [facility]

## 2025-05-05 ENCOUNTER — TELEPHONE (OUTPATIENT)
Dept: OCCUPATIONAL MEDICINE | Facility: CLINIC | Age: 35
End: 2025-05-05
Payer: COMMERCIAL

## 2025-05-06 ENCOUNTER — OCCUPATIONAL MEDICINE (OUTPATIENT)
Dept: OCCUPATIONAL MEDICINE | Facility: CLINIC | Age: 35
End: 2025-05-06
Payer: COMMERCIAL

## 2025-05-06 VITALS
HEART RATE: 90 BPM | RESPIRATION RATE: 18 BRPM | TEMPERATURE: 98 F | HEIGHT: 71 IN | SYSTOLIC BLOOD PRESSURE: 116 MMHG | WEIGHT: 238 LBS | OXYGEN SATURATION: 97 % | BODY MASS INDEX: 33.32 KG/M2 | DIASTOLIC BLOOD PRESSURE: 76 MMHG

## 2025-05-06 DIAGNOSIS — S29.019D THORACIC MYOFASCIAL STRAIN, SUBSEQUENT ENCOUNTER: ICD-10-CM

## 2025-05-06 PROCEDURE — 99213 OFFICE O/P EST LOW 20 MIN: CPT | Performed by: PREVENTIVE MEDICINE

## 2025-05-06 NOTE — LETTER
PHYSICIAN’S AND CHIROPRACTIC PHYSICIAN'S   PROGRESS REPORT   CERTIFICATION OF DISABILITY Claim Number:     Social Security Number:    Patient’s Name: Rich Brown Date of Injury: 10/9/2024   Employer: [employer] Name of MCO (if applicable):      Patient’s Job Description/Occupation:        Previous Injuries/Diseases/Surgeries Contributing to the Condition:         Diagnosis: (S29.019D) Thoracic myofascial strain, subsequent encounter      Related to the Industrial Injury? Yes     Explain:        Objective Medical Findings: Thoracic: No gross deformity.  Area of pain over the mid to lower thoracic paraspinal musculature and midline  MRI Thoracic: Mild right-sided posterior facet arthropathy at T3-T4 and T4-T5         None - Discharged                         Stable  No                 Ratable  No        Generally Improved                         Condition Worsened               X   Condition Same  May Have Suffered a Permanent Disability No     Treatment Plan:    Referral to physiatry approved, appointment pending  Continue lidocaine patches as needed, sent refill May use OTC Aleve/ibuprofen as needed  Will continue the same work restrictions  Follow-up here if not seen by physiatry              No Change in Therapy                  PT/OT Prescribed                      Medication May be Used While Working        Case Management                          PT/OT Discontinued    Consultation    Further Diagnostic Studies:    Prescription(s)                 Released to FULL DUTY /No Restrictions on (Date):       Certified TOTALLY TEMPORARILY DISABLED (Indicate Dates) From:   To:    X  Released to RESTRICTED/Modified Duty on (Date): From: 5/6/2025 To: 6/12/2025  Restrictions Are:         No Sitting    No Standing    No Pulling Other:  Limit lifting to less than 40 pounds       No Bending at Waist     No Stooping     No Lifting        No Carrying     No Walking Lifting Restricted to (lbs.):           No Pushing        No Climbing     No Reaching Above Shoulders       Date of Next Visit:  6/12/2025  @4:15 pm  Date of this Exam: 5/6/2025 Physician/Chiropractic Physician Name: Rene Alonso D.O. Physician/Chiropractic Physician Signature:  Rene Alonso DO MPH     [place]:  [address] [place] [address], [address] [place], [place] [address] - Telephone [phone] [place]:  [address] [place] [address], [address] [place], [place] [address] - Telephone [phone]    [email or web address]  D-39 (Rev. 10/24)

## 2025-05-06 NOTE — PROGRESS NOTES
Subjective:     Rich Brown is a 34 y.o. male who presents for Follow-Up (WC DOI 10/9/24 back, rm 18)    CONCEPCIÓN: He was moving power walls from a broken pallet in a pallet while lifting these he felt sudden pain on in the mid back.  He was seen in urgent care x 3.  Advised NSAIDs, muscle relaxers and lidocaine patches.       10/29/2024: Patient states that overall symptoms are about the same.  He is only had minimal improvement.  Pain is mostly in the mid to lower thoracic spine.  Denies radiating pain, numbness or tingling.  He states the most helpful thing thus far has been the Robaxin that he was prescribed.  He states he takes it twice a day usually for pain relief.  He states that he has been off work due to restrictions.  He denies any other prior back injuries     11/26/2024: Symptoms unchanged.  Continues to have pain in the mid back.  Denies radicular symptoms.  Just started physical therapy couple weeks ago for the initial evaluation, has first treatment scheduled for tomorrow.  He states that some days pain is pretty minimal but other days pain is more moderate.  Taking less of the Robaxin.     1/7/2025: Patient states that he has good improvement with physical therapy.  He states that he has been spent a lot of time on the treadmill and going on walks try to get ready to build to go back to work full duty.  He states that he has a couple days where the pain is pretty minimal but then he will have a few days where the pain is more severe.  But overall feels significantly improved.  Still has 4 sessions of physical therapy remaining.     2/12/2025: Patient states that overall symptoms are about the same.  He states that he has some good days and some bad days.  He states that he had a couple days with minimal pain and then had a 1 day with constant pain throughout the entire day.  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  He states he has 1  "session of physical therapy left he states is difficult to get his visits scheduled for times when he was not at work.  He states that he was not aware that the MRI had been approved.     4/17/2025: Patient notes good improvement with lidocaine patches.  He states they helped significantly.  He states that he is able to go through a whole work shift without much pain at all with the patch.  He states he was finally able to get the MRI scheduled and is scheduled for April 30.    5/6/2025: Patient states that symptoms are mostly the same. Had a few good days, but the past few days have been worse. Received a call to schedule physiatry but has not called back to schedule    ROS    SOCHX: Works as a  at [employer]   FH: No pertinent family history to this problem.       Objective:     /76   Pulse 90   Temp 36.7 °C (98 °F)   Resp 18   Ht 1.803 m (5' 11\")   Wt 108 kg (238 lb)   SpO2 97%   BMI 33.19 kg/m²     Constitutional: Patient is in no acute distress. Appears well-developed and well-nourished.   Cardiovascular: Normal rate.    Pulmonary/Chest: Effort normal. No respiratory distress.   Neurological: Patient is alert and oriented to person, place, and time.   Skin: Skin is warm and dry.   Psychiatric: Normal mood and affect. Behavior is normal.     Thoracic: No gross deformity.  Area of pain over the mid to lower thoracic paraspinal musculature and midline  MRI Thoracic: Mild right-sided posterior facet arthropathy at T3-T4 and T4-T5    Assessment/Plan:     1. Thoracic myofascial strain, subsequent encounter      Referral to physiatry approved, appointment pending  Continue lidocaine patches as needed, sent refill May use OTC Aleve/ibuprofen as needed  Will continue the same work restrictions  Follow-up here if not seen by physiatry         Work Status: Restricted Duty - see D-39 or other state/federal worker's compensation forms for specific restrictions if applicable  Follow up 4 " weeks    Differential diagnosis, natural history, supportive care, and indications for immediate follow-up discussed.

## 2025-05-19 ENCOUNTER — TELEPHONE (OUTPATIENT)
Dept: PHYSICAL THERAPY | Facility: MEDICAL CENTER | Age: 35
End: 2025-05-19
Payer: COMMERCIAL

## 2025-05-19 NOTE — OP THERAPY DISCHARGE SUMMARY
Outpatient Physical Therapy  DISCHARGE SUMMARY NOTE      [facility]  [address]  [place] [address]  Phone:  [phone]  Fax:  [phone]    Date of Visit: 05/19/2025    Patient: Rich Brown  YOB: 1990  MRN: [id number]     Referring Provider: No referring provider defined for this encounter.   Referring Diagnosis No admission diagnoses are documented for this encounter.         Functional Assessment Used        Your patient is being discharged from Physical Therapy with the following comments:   Progress plateau  Patient cancelled or missed more than 2 scheduled appointments/non-compliant  Patient has failed to schedule or reschedule follow-up visits    Comments:  Pt was seen in skilled PT for 7 sessions with this provider. Pt has failed to schedule follow up appointments and will be discharged today due to failure to comply with POC.      Limitations Remaining:  Remaining limitations are unknown.     Recommendations:  D/C patient, if pt requests to return to receive additional skilled physical therapy services, please obtain new referral.     Sera Hernandez, PT    Date: 5/19/2025

## 2025-06-09 DIAGNOSIS — K21.9 GASTROESOPHAGEAL REFLUX DISEASE WITHOUT ESOPHAGITIS: ICD-10-CM

## 2025-06-11 ENCOUNTER — TELEPHONE (OUTPATIENT)
Dept: OCCUPATIONAL MEDICINE | Facility: CLINIC | Age: 35
End: 2025-06-11
Payer: COMMERCIAL

## 2025-06-11 RX ORDER — PANTOPRAZOLE SODIUM 20 MG/1
20 TABLET, DELAYED RELEASE ORAL 2 TIMES DAILY
Qty: 180 TABLET | Refills: 0 | Status: SHIPPED | OUTPATIENT
Start: 2025-06-11

## 2025-06-11 NOTE — TELEPHONE ENCOUNTER
Possible TRACI - If the patient has been seen by [facility] they do not need to come in for his visit tomorrow. If the patient has not made his appt or has not seen [facility] for their initial visit they need to come in, they also need to reach out to them before tomorrows appt.     Type Date User Summary Attachment   Referral Notes 04/28/2025  7:42 AM Florina Mcguire Referral information sent to the following: -   Note:  Referral information sent to the following:  Phys Med and Rehab     [facility]  [address]  [place] [address]  Phone: [phone]     Patient Care Coordination notes:  Referral faxed

## 2025-06-13 DIAGNOSIS — S29.019D THORACIC MYOFASCIAL STRAIN, SUBSEQUENT ENCOUNTER: ICD-10-CM

## 2025-06-13 RX ORDER — LIDOCAINE 50 MG/G
PATCH TOPICAL
Qty: 30 PATCH | Refills: 0 | Status: SHIPPED | OUTPATIENT
Start: 2025-06-13

## 2025-07-01 ENCOUNTER — APPOINTMENT (OUTPATIENT)
Dept: OCCUPATIONAL MEDICINE | Facility: CLINIC | Age: 35
End: 2025-07-01
Payer: COMMERCIAL